# Patient Record
Sex: FEMALE | Race: WHITE | NOT HISPANIC OR LATINO | Employment: FULL TIME | URBAN - METROPOLITAN AREA
[De-identification: names, ages, dates, MRNs, and addresses within clinical notes are randomized per-mention and may not be internally consistent; named-entity substitution may affect disease eponyms.]

---

## 2017-12-07 ENCOUNTER — APPOINTMENT (OUTPATIENT)
Dept: WOMENS IMAGING | Facility: HOSPITAL | Age: 47
End: 2017-12-07

## 2017-12-07 PROCEDURE — 77063 BREAST TOMOSYNTHESIS BI: CPT | Performed by: RADIOLOGY

## 2017-12-07 PROCEDURE — 77067 SCR MAMMO BI INCL CAD: CPT | Performed by: RADIOLOGY

## 2018-04-09 ENCOUNTER — OFFICE VISIT (OUTPATIENT)
Dept: GASTROENTEROLOGY | Facility: CLINIC | Age: 48
End: 2018-04-09

## 2018-04-09 VITALS
DIASTOLIC BLOOD PRESSURE: 72 MMHG | WEIGHT: 212.4 LBS | BODY MASS INDEX: 33.34 KG/M2 | HEIGHT: 67 IN | TEMPERATURE: 98.5 F | SYSTOLIC BLOOD PRESSURE: 136 MMHG

## 2018-04-09 DIAGNOSIS — K62.89 ANAL OR RECTAL PAIN: Primary | ICD-10-CM

## 2018-04-09 PROCEDURE — 99204 OFFICE O/P NEW MOD 45 MIN: CPT | Performed by: INTERNAL MEDICINE

## 2018-04-09 RX ORDER — ALBUTEROL SULFATE 90 UG/1
2 AEROSOL, METERED RESPIRATORY (INHALATION) EVERY 4 HOURS PRN
COMMUNITY
Start: 2017-02-23

## 2018-04-09 RX ORDER — HYDROCORTISONE ACETATE 25 MG/1
25 SUPPOSITORY RECTAL DAILY
Qty: 10 SUPPOSITORY | Refills: 5 | Status: SHIPPED | OUTPATIENT
Start: 2018-04-09 | End: 2021-04-13

## 2018-04-09 RX ORDER — FLUTICASONE PROPIONATE 110 UG/1
2 AEROSOL, METERED RESPIRATORY (INHALATION)
COMMUNITY
Start: 2017-02-23 | End: 2021-04-13

## 2018-04-09 NOTE — PROGRESS NOTES
Chief Complaint   Patient presents with   • Rectal Pain        Sonia Henning is a  47 y.o. female here for an initial visit for Rectal pain    HPI This 47-year-old -American female patient of Dr. Africa Lea presents with occasional discomfort at the rectum associated with pressure or frequent bowel movements.  She had undergone colonoscopic examination in 2010 with evidence of internal and external hemorrhoids.  She denies any rectal bleeding.  She has had issues with her hemorrhoids since the birth of her child.  We had treated her for internal hemorrhoidal problems in the past with suppositories with good effect.  She admits to intermittent episodes of multiple bowel movements as well as change in stool caliber.  She would be due for screening evaluation of her colon in 2020.  I advised trial of suppositories to treat her hemorrhoids.  If her symptoms persist I would consider more formal GI assessment.    Past Medical History:   Diagnosis Date   • Asthma    • Sleep apnea        Current Outpatient Prescriptions   Medication Sig Dispense Refill   • albuterol (PROAIR HFA) 108 (90 Base) MCG/ACT inhaler Inhale 2 puffs.     • fluticasone (FLOVENT HFA) 110 MCG/ACT inhaler Inhale 2 puffs.       No current facility-administered medications for this visit.        PRN Meds:.    Allergies   Allergen Reactions   • Tetracyclines & Related Hives       Social History     Social History   • Marital status: Unknown     Spouse name: N/A   • Number of children: N/A   • Years of education: N/A     Occupational History   • Not on file.     Social History Main Topics   • Smoking status: Never Smoker   • Smokeless tobacco: Never Used   • Alcohol use Yes      Comment: socially    • Drug use: No   • Sexual activity: Not on file     Other Topics Concern   • Not on file     Social History Narrative   • No narrative on file       History reviewed. No pertinent family history.    Review of Systems   Constitutional: Negative for activity  change, appetite change, fatigue and unexpected weight change.   HENT: Negative for congestion, facial swelling, sore throat, trouble swallowing and voice change.    Eyes: Negative for photophobia and visual disturbance.   Respiratory: Negative for cough and choking.    Cardiovascular: Negative for chest pain.   Gastrointestinal: Positive for rectal pain. Negative for abdominal distention, abdominal pain, anal bleeding, blood in stool, constipation, diarrhea, nausea and vomiting.   Endocrine: Negative for polyphagia.   Musculoskeletal: Negative for arthralgias, gait problem and joint swelling.   Skin: Negative for color change, pallor and rash.   Allergic/Immunologic: Negative for food allergies.   Neurological: Negative for speech difficulty and headaches.   Hematological: Does not bruise/bleed easily.   Psychiatric/Behavioral: Negative for agitation, confusion and sleep disturbance.       Vitals:    04/09/18 1326   BP: 136/72   Temp: 98.5 °F (36.9 °C)       Physical Exam   Constitutional: She is oriented to person, place, and time. She appears well-developed and well-nourished.   HENT:   Head: Normocephalic.   Mouth/Throat: Oropharynx is clear and moist.   Eyes: Conjunctivae and EOM are normal.   Neck: Normal range of motion.   Cardiovascular: Normal rate and regular rhythm.    Pulmonary/Chest: Breath sounds normal.   Abdominal: Soft. Bowel sounds are normal.   Musculoskeletal: Normal range of motion.   Neurological: She is alert and oriented to person, place, and time.   Skin: Skin is warm and dry.   Psychiatric: She has a normal mood and affect. Her behavior is normal.       ASSESSMENT   #1 rectal pain: Consistent with hemorrhoidal issues        PLAN  Trial of Anusol with hydrocortisone suppositories to be taken at bedtime  Patient to call with a progress report, if no improvement we'll consider more formal GI assessment      ICD-10-CM ICD-9-CM   1. Anal or rectal pain K62.89 569.42

## 2018-04-19 ENCOUNTER — TRANSCRIBE ORDERS (OUTPATIENT)
Dept: ADMINISTRATIVE | Facility: HOSPITAL | Age: 48
End: 2018-04-19

## 2018-04-19 ENCOUNTER — HOSPITAL ENCOUNTER (OUTPATIENT)
Dept: CARDIOLOGY | Facility: HOSPITAL | Age: 48
Discharge: HOME | End: 2018-04-19
Attending: SPECIALIST | Admitting: SPECIALIST

## 2018-04-19 ENCOUNTER — HOSPITAL ENCOUNTER (OUTPATIENT)
Dept: GENERAL RADIOLOGY | Facility: HOSPITAL | Age: 48
Discharge: HOME | End: 2018-04-19
Attending: SPECIALIST

## 2018-04-19 DIAGNOSIS — I15.9 SECONDARY HYPERTENSION: ICD-10-CM

## 2018-04-19 DIAGNOSIS — I10 HYPERTENSION, UNSPECIFIED TYPE: ICD-10-CM

## 2018-04-19 DIAGNOSIS — I15.9 SECONDARY HYPERTENSION: Primary | ICD-10-CM

## 2018-04-19 PROCEDURE — 71046 X-RAY EXAM CHEST 2 VIEWS: CPT

## 2018-04-19 PROCEDURE — 93005 ELECTROCARDIOGRAM TRACING: CPT | Performed by: SPECIALIST

## 2018-04-19 PROCEDURE — 93010 ELECTROCARDIOGRAM REPORT: CPT | Performed by: INTERNAL MEDICINE

## 2018-10-09 ENCOUNTER — TRANSCRIBE ORDERS (OUTPATIENT)
Dept: ADMINISTRATIVE | Facility: HOSPITAL | Age: 48
End: 2018-10-09

## 2018-10-09 DIAGNOSIS — E04.9 NON-TOXIC GOITER: Primary | ICD-10-CM

## 2018-10-13 ENCOUNTER — HOSPITAL ENCOUNTER (OUTPATIENT)
Dept: ULTRASOUND IMAGING | Facility: HOSPITAL | Age: 48
Discharge: HOME | End: 2018-10-13
Attending: SPECIALIST | Admitting: SPECIALIST

## 2018-10-13 DIAGNOSIS — E04.9 NON-TOXIC GOITER: ICD-10-CM

## 2018-10-13 PROCEDURE — 76536 US EXAM OF HEAD AND NECK: CPT

## 2019-03-07 ENCOUNTER — APPOINTMENT (OUTPATIENT)
Dept: WOMENS IMAGING | Facility: HOSPITAL | Age: 49
End: 2019-03-07

## 2019-03-07 PROCEDURE — 77063 BREAST TOMOSYNTHESIS BI: CPT | Performed by: RADIOLOGY

## 2019-03-07 PROCEDURE — 77067 SCR MAMMO BI INCL CAD: CPT | Performed by: RADIOLOGY

## 2019-07-10 ENCOUNTER — OFFICE VISIT (OUTPATIENT)
Dept: SURGERY | Facility: CLINIC | Age: 49
End: 2019-07-10

## 2019-07-10 VITALS
OXYGEN SATURATION: 97 % | BODY MASS INDEX: 31.39 KG/M2 | HEART RATE: 63 BPM | HEIGHT: 67 IN | WEIGHT: 200 LBS | SYSTOLIC BLOOD PRESSURE: 159 MMHG | DIASTOLIC BLOOD PRESSURE: 89 MMHG

## 2019-07-10 DIAGNOSIS — R22.2 MASS OF CHEST WALL, RIGHT: Primary | ICD-10-CM

## 2019-07-10 PROCEDURE — 99243 OFF/OP CNSLTJ NEW/EST LOW 30: CPT | Performed by: THORACIC SURGERY (CARDIOTHORACIC VASCULAR SURGERY)

## 2019-07-10 NOTE — PROGRESS NOTES
Subjective   Patient ID: Sonia Henning is a 49 y.o. female is here today at the request of Dr Rollins.    History of Present Illness  Dear Colleague,  Sonia Henning was seen in our office today for evaluation and treatment of the right sternoclavicular joint.  Patient is a 49-year-old -American female.  She was working out lifting some weights when she felt a pop in the right sternoclavicular joint area.  This was sore for several days but has not caused her any problems since.  There has been very little swelling.  There has been no redness.  There is been no pain.  Patient has resumed all of her normal activities without any restrictions.  She mentioned this to her primary care physician and that she is here now for further evaluation.    The following portions of the patient's history were reviewed and updated as appropriate: allergies, current medications, past family history, past medical history, past social history, past surgical history and problem list.  Review of Systems   Constitution: Negative.   HENT: Negative.    Eyes: Negative.    Cardiovascular: Negative.    Respiratory: Negative.    Endocrine: Negative.    Hematologic/Lymphatic: Negative.    Skin: Negative.    Musculoskeletal: Negative.    Gastrointestinal: Negative.    Genitourinary: Negative.    Neurological: Negative.    Psychiatric/Behavioral: Negative.    Allergic/Immunologic: Negative.      Patient Active Problem List   Diagnosis   • Mass of chest wall, right     Past Medical History:   Diagnosis Date   • Asthma    • Sleep apnea      Past Surgical History:   Procedure Laterality Date   • COLONOSCOPY  12/21/2010    [facility]    • COLONOSCOPY  03/2000   • CYST REMOVAL     • SINUS SURGERY       History reviewed. No pertinent family history.  Social History     Socioeconomic History   • Marital status: Unknown     Spouse name: Not on file   • Number of children: Not on file   • Years of education: Not on file   • Highest education level: Not  on file   Tobacco Use   • Smoking status: Never Smoker   • Smokeless tobacco: Never Used   Substance and Sexual Activity   • Alcohol use: Yes     Comment: socially    • Drug use: No       Current Outpatient Medications:   •  albuterol (PROAIR HFA) 108 (90 Base) MCG/ACT inhaler, Inhale 2 puffs., Disp: , Rfl:   •  fluticasone (FLOVENT HFA) 110 MCG/ACT inhaler, Inhale 2 puffs., Disp: , Rfl:   •  hydrocortisone (ANUSOL-HC) 25 MG suppository, Insert 1 suppository into the rectum Daily. Administer suppository at bedtime, Disp: 10 suppository, Rfl: 5  Allergies   Allergen Reactions   • Tetracyclines & Related Hives        Objective   Vitals:    07/10/19 1030   BP: 159/89   Pulse: 63   SpO2: 97%     Physical Exam   Constitutional: She is oriented to person, place, and time. She appears well-developed and well-nourished.   HENT:   Head: Normocephalic.   Eyes: Conjunctivae, EOM and lids are normal. Pupils are equal, round, and reactive to light.   Neck: Trachea normal and normal range of motion. Neck supple. No hepatojugular reflux and no JVD present. Carotid bruit is not present. No thyroid mass and no thyromegaly present.   Cardiovascular: Normal rate, regular rhythm, S1 normal, S2 normal, normal heart sounds and normal pulses.  No extrasystoles are present. PMI is not displaced.   Pulmonary/Chest: Effort normal and breath sounds normal.       Abdominal: Soft. Normal appearance and bowel sounds are normal. She exhibits no mass. There is no hepatosplenomegaly. There is no tenderness. No hernia.   Musculoskeletal: Normal range of motion.   Neurological: She is alert and oriented to person, place, and time. She has normal strength and normal reflexes. No cranial nerve deficit or sensory deficit. She displays a negative Romberg sign.   Skin: Skin is warm, dry and intact.   Psychiatric: She has a normal mood and affect. Her speech is normal and behavior is normal. Judgment and thought content normal. Cognition and memory are  normal.     Independent Review of Radiographic Studies:    Chest x-ray performed April 19, 2018 was independently reviewed.  This is the most recent x-ray available.  No significant abnormalities were identified.  No acute disease process.    Assessment/Plan   Assessment: I believe that this is just some swelling in the sternoclavicular joint area.  In a lady this age this could certainly represent some arthritic changes in this joint.  Certainly see nothing that would be concerning for a malignancy.  I think further evaluation is warranted and have recommended a CT of the chest.  I have explained all this to the patient in detail.  I have explained the need for the CT scan.  I have answered all of her questions.      Plan: Patient will undergo CT scan of the chest.  I will review the scan and then call her with the results.  I will keep you informed of her progress.    Diagnoses and all orders for this visit:    Mass of chest wall, right  -     CT Chest Without Contrast; Future

## 2019-10-30 ENCOUNTER — HOSPITAL ENCOUNTER (OUTPATIENT)
Dept: CT IMAGING | Facility: HOSPITAL | Age: 49
Discharge: HOME | End: 2019-10-30
Admitting: THORACIC SURGERY (CARDIOTHORACIC VASCULAR SURGERY)

## 2019-10-30 DIAGNOSIS — R22.2 MASS OF CHEST WALL, RIGHT: ICD-10-CM

## 2019-10-30 PROCEDURE — 71250 CT THORAX DX C-: CPT

## 2019-11-06 ENCOUNTER — HOSPITAL ENCOUNTER (OUTPATIENT)
Dept: GENERAL RADIOLOGY | Facility: HOSPITAL | Age: 49
Discharge: HOME | End: 2019-11-06
Admitting: SPECIALIST

## 2019-11-06 DIAGNOSIS — R52 PAIN: ICD-10-CM

## 2019-11-06 PROCEDURE — 72110 X-RAY EXAM L-2 SPINE 4/>VWS: CPT

## 2020-08-27 ENCOUNTER — APPOINTMENT (OUTPATIENT)
Dept: WOMENS IMAGING | Facility: HOSPITAL | Age: 50
End: 2020-08-27

## 2020-08-27 PROCEDURE — 77067 SCR MAMMO BI INCL CAD: CPT | Performed by: RADIOLOGY

## 2020-08-27 PROCEDURE — 77063 BREAST TOMOSYNTHESIS BI: CPT | Performed by: RADIOLOGY

## 2021-03-29 ENCOUNTER — OFFICE VISIT (OUTPATIENT)
Dept: GASTROENTEROLOGY | Facility: CLINIC | Age: 51
End: 2021-03-29

## 2021-03-29 VITALS — BODY MASS INDEX: 31.55 KG/M2 | HEIGHT: 67 IN | TEMPERATURE: 98 F | WEIGHT: 201 LBS

## 2021-03-29 DIAGNOSIS — Z12.12 SCREENING FOR COLORECTAL CANCER: ICD-10-CM

## 2021-03-29 DIAGNOSIS — K64.9 HEMORRHOIDS, UNSPECIFIED HEMORRHOID TYPE: Primary | ICD-10-CM

## 2021-03-29 DIAGNOSIS — Z12.11 SCREENING FOR COLORECTAL CANCER: ICD-10-CM

## 2021-03-29 PROCEDURE — 99213 OFFICE O/P EST LOW 20 MIN: CPT | Performed by: INTERNAL MEDICINE

## 2021-03-29 NOTE — PROGRESS NOTES
"Chief Complaint   Patient presents with   • Hemorrhoids        Sonia Henning is a  50 y.o. female here for a follow up visit for hemorrhoids    HPI this 50-year-old -American female patient of Dr. Africa Lea presents with \"hemorrhoids\".  She had been seen through this office in April 2018 for rectal pain and a known history of both internal and external hemorrhoids by previous colonoscopic evaluation from 2010.  She was treated with symptomatic medications and offered more formal GI assessment but did not return for follow-up.  She describes a sensation of \"blockage\" although she is having daily bowel movements she requires straining to pass stools.  She denies rectal pain or bleeding.  The caliber of her stools is smaller than normal but not pencil thin.  Given the fact she is due for screening evaluation of her colon and this change in bowel caliber I encouraged her to undergo colonoscopic evaluation.  She is amenable to this.    Past Medical History:   Diagnosis Date   • Asthma    • Sleep apnea        Current Outpatient Medications   Medication Sig Dispense Refill   • albuterol (PROAIR HFA) 108 (90 Base) MCG/ACT inhaler Inhale 2 puffs.     • fluticasone (FLOVENT HFA) 110 MCG/ACT inhaler Inhale 2 puffs.     • hydrocortisone (ANUSOL-HC) 25 MG suppository Insert 1 suppository into the rectum Daily. Administer suppository at bedtime 10 suppository 5     No current facility-administered medications for this visit.       PRN Meds:.    Allergies   Allergen Reactions   • Tetracyclines & Related Hives       Social History     Socioeconomic History   • Marital status: Single     Spouse name: Not on file   • Number of children: Not on file   • Years of education: Not on file   • Highest education level: Not on file   Tobacco Use   • Smoking status: Never Smoker   • Smokeless tobacco: Never Used   Substance and Sexual Activity   • Alcohol use: Yes     Comment: socially    • Drug use: No       History reviewed. No " pertinent family history.    Review of Systems   Constitutional: Negative for activity change, appetite change, fatigue and unexpected weight change.   HENT: Negative for congestion, facial swelling, sore throat, trouble swallowing and voice change.    Eyes: Negative for photophobia and visual disturbance.   Respiratory: Negative for cough and choking.    Cardiovascular: Negative for chest pain.   Gastrointestinal: Negative for abdominal distention, abdominal pain, anal bleeding, blood in stool, constipation, diarrhea, nausea, rectal pain and vomiting.   Endocrine: Negative for polyphagia.   Musculoskeletal: Negative for arthralgias, gait problem and joint swelling.   Skin: Negative for color change, pallor and rash.   Allergic/Immunologic: Negative for food allergies.   Neurological: Negative for speech difficulty and headaches.   Hematological: Does not bruise/bleed easily.   Psychiatric/Behavioral: Negative for agitation, confusion and sleep disturbance.       Vitals:    03/29/21 1445   Temp: 98 °F (36.7 °C)       Physical Exam  Constitutional:       Appearance: She is well-developed.   HENT:      Head: Normocephalic.   Eyes:      Conjunctiva/sclera: Conjunctivae normal.   Cardiovascular:      Rate and Rhythm: Normal rate and regular rhythm.   Pulmonary:      Breath sounds: Normal breath sounds.   Abdominal:      General: Bowel sounds are normal.      Palpations: Abdomen is soft.   Musculoskeletal:         General: Normal range of motion.      Cervical back: Normal range of motion.   Skin:     General: Skin is warm and dry.   Neurological:      Mental Status: She is alert and oriented to person, place, and time.   Psychiatric:         Behavior: Behavior normal.         ASSESSMENT   #1 incomplete evacuation  #2 history of hemorrhoids  #3 screening for colorectal cancer      PLAN  Schedule colonoscopy  Pending results may consider colorectal evaluation / treatment      ICD-10-CM ICD-9-CM   1. Hemorrhoids,  unspecified hemorrhoid type  K64.9 455.6

## 2021-04-13 ENCOUNTER — APPOINTMENT (OUTPATIENT)
Dept: CT IMAGING | Facility: HOSPITAL | Age: 51
End: 2021-04-13

## 2021-04-13 ENCOUNTER — HOSPITAL ENCOUNTER (INPATIENT)
Facility: HOSPITAL | Age: 51
LOS: 3 days | Discharge: HOME | End: 2021-04-16
Attending: EMERGENCY MEDICINE | Admitting: INTERNAL MEDICINE

## 2021-04-13 DIAGNOSIS — I61.9 INTRAPARENCHYMAL HEMORRHAGE OF BRAIN (HCC): Primary | ICD-10-CM

## 2021-04-13 DIAGNOSIS — I10 HYPERTENSION, UNSPECIFIED TYPE: ICD-10-CM

## 2021-04-13 DIAGNOSIS — R42 DIZZINESS: ICD-10-CM

## 2021-04-13 LAB
ALBUMIN SERPL-MCNC: 4.7 G/DL (ref 3.5–5.2)
ALBUMIN/GLOB SERPL: 1.2 G/DL
ALP SERPL-CCNC: 133 U/L (ref 39–117)
ALT SERPL W P-5'-P-CCNC: 17 U/L (ref 1–33)
ANION GAP SERPL CALCULATED.3IONS-SCNC: 12 MMOL/L (ref 5–15)
AST SERPL-CCNC: 26 U/L (ref 1–32)
BACTERIA UR QL AUTO: ABNORMAL /HPF
BASOPHILS # BLD AUTO: 0.05 10*3/MM3 (ref 0–0.2)
BASOPHILS NFR BLD AUTO: 0.4 % (ref 0–1.5)
BILIRUB SERPL-MCNC: 0.5 MG/DL (ref 0–1.2)
BILIRUB UR QL STRIP: NEGATIVE
BUN SERPL-MCNC: 12 MG/DL (ref 6–20)
BUN/CREAT SERPL: 9 (ref 7–25)
CALCIUM SPEC-SCNC: 9.4 MG/DL (ref 8.6–10.5)
CHLORIDE SERPL-SCNC: 99 MMOL/L (ref 98–107)
CLARITY UR: CLEAR
CO2 SERPL-SCNC: 28 MMOL/L (ref 22–29)
COLOR UR: YELLOW
CREAT SERPL-MCNC: 1.33 MG/DL (ref 0.57–1)
DEPRECATED RDW RBC AUTO: 36.7 FL (ref 37–54)
EOSINOPHIL # BLD AUTO: 0.03 10*3/MM3 (ref 0–0.4)
EOSINOPHIL NFR BLD AUTO: 0.3 % (ref 0.3–6.2)
ERYTHROCYTE [DISTWIDTH] IN BLOOD BY AUTOMATED COUNT: 12.1 % (ref 12.3–15.4)
GFR SERPL CREATININE-BSD FRML MDRD: 42 ML/MIN/1.73
GLOBULIN UR ELPH-MCNC: 4 GM/DL
GLUCOSE BLDC GLUCOMTR-MCNC: 116 MG/DL (ref 70–130)
GLUCOSE BLDC GLUCOMTR-MCNC: 94 MG/DL (ref 70–130)
GLUCOSE SERPL-MCNC: 90 MG/DL (ref 65–99)
GLUCOSE UR STRIP-MCNC: NEGATIVE MG/DL
HCT VFR BLD AUTO: 44.5 % (ref 34–46.6)
HGB BLD-MCNC: 14.3 G/DL (ref 12–15.9)
HGB UR QL STRIP.AUTO: ABNORMAL
HYALINE CASTS UR QL AUTO: ABNORMAL /LPF
IMM GRANULOCYTES # BLD AUTO: 0.06 10*3/MM3 (ref 0–0.05)
IMM GRANULOCYTES NFR BLD AUTO: 0.5 % (ref 0–0.5)
KETONES UR QL STRIP: NEGATIVE
LEUKOCYTE ESTERASE UR QL STRIP.AUTO: ABNORMAL
LYMPHOCYTES # BLD AUTO: 0.82 10*3/MM3 (ref 0.7–3.1)
LYMPHOCYTES NFR BLD AUTO: 7.1 % (ref 19.6–45.3)
MAGNESIUM SERPL-MCNC: 2.5 MG/DL (ref 1.6–2.6)
MCH RBC QN AUTO: 26.9 PG (ref 26.6–33)
MCHC RBC AUTO-ENTMCNC: 32.1 G/DL (ref 31.5–35.7)
MCV RBC AUTO: 83.6 FL (ref 79–97)
MONOCYTES # BLD AUTO: 0.67 10*3/MM3 (ref 0.1–0.9)
MONOCYTES NFR BLD AUTO: 5.8 % (ref 5–12)
NEUTROPHILS NFR BLD AUTO: 85.9 % (ref 42.7–76)
NEUTROPHILS NFR BLD AUTO: 9.88 10*3/MM3 (ref 1.7–7)
NITRITE UR QL STRIP: NEGATIVE
NRBC BLD AUTO-RTO: 0 /100 WBC (ref 0–0.2)
PH UR STRIP.AUTO: 6.5 [PH] (ref 5–8)
PLATELET # BLD AUTO: 266 10*3/MM3 (ref 140–450)
PMV BLD AUTO: 8.8 FL (ref 6–12)
POTASSIUM SERPL-SCNC: 3.3 MMOL/L (ref 3.5–5.2)
PROT SERPL-MCNC: 8.7 G/DL (ref 6–8.5)
PROT UR QL STRIP: ABNORMAL
QT INTERVAL: 330 MS
RBC # BLD AUTO: 5.32 10*6/MM3 (ref 3.77–5.28)
RBC # UR: ABNORMAL /HPF
REF LAB TEST METHOD: ABNORMAL
SARS-COV-2 RNA RESP QL NAA+PROBE: NOT DETECTED
SODIUM SERPL-SCNC: 139 MMOL/L (ref 136–145)
SP GR UR STRIP: 1.01 (ref 1–1.03)
SQUAMOUS #/AREA URNS HPF: ABNORMAL /HPF
TROPONIN T SERPL-MCNC: <0.01 NG/ML (ref 0–0.03)
UROBILINOGEN UR QL STRIP: ABNORMAL
WBC # BLD AUTO: 11.51 10*3/MM3 (ref 3.4–10.8)
WBC UR QL AUTO: ABNORMAL /HPF

## 2021-04-13 PROCEDURE — 83735 ASSAY OF MAGNESIUM: CPT | Performed by: EMERGENCY MEDICINE

## 2021-04-13 PROCEDURE — 70496 CT ANGIOGRAPHY HEAD: CPT

## 2021-04-13 PROCEDURE — 82962 GLUCOSE BLOOD TEST: CPT

## 2021-04-13 PROCEDURE — 70498 CT ANGIOGRAPHY NECK: CPT

## 2021-04-13 PROCEDURE — 93005 ELECTROCARDIOGRAM TRACING: CPT

## 2021-04-13 PROCEDURE — 81001 URINALYSIS AUTO W/SCOPE: CPT | Performed by: EMERGENCY MEDICINE

## 2021-04-13 PROCEDURE — U0003 INFECTIOUS AGENT DETECTION BY NUCLEIC ACID (DNA OR RNA); SEVERE ACUTE RESPIRATORY SYNDROME CORONAVIRUS 2 (SARS-COV-2) (CORONAVIRUS DISEASE [COVID-19]), AMPLIFIED PROBE TECHNIQUE, MAKING USE OF HIGH THROUGHPUT TECHNOLOGIES AS DESCRIBED BY CMS-2020-01-R: HCPCS | Performed by: EMERGENCY MEDICINE

## 2021-04-13 PROCEDURE — 25010000002 LEVETIRACETAM IN NACL 0.82% 500 MG/100ML SOLUTION: Performed by: NURSE PRACTITIONER

## 2021-04-13 PROCEDURE — 0 IOPAMIDOL PER 1 ML: Performed by: INTERNAL MEDICINE

## 2021-04-13 PROCEDURE — 80053 COMPREHEN METABOLIC PANEL: CPT | Performed by: EMERGENCY MEDICINE

## 2021-04-13 PROCEDURE — 85025 COMPLETE CBC W/AUTO DIFF WBC: CPT | Performed by: EMERGENCY MEDICINE

## 2021-04-13 PROCEDURE — 70450 CT HEAD/BRAIN W/O DYE: CPT

## 2021-04-13 PROCEDURE — 84484 ASSAY OF TROPONIN QUANT: CPT | Performed by: EMERGENCY MEDICINE

## 2021-04-13 PROCEDURE — 99254 IP/OBS CNSLTJ NEW/EST MOD 60: CPT | Performed by: NURSE PRACTITIONER

## 2021-04-13 PROCEDURE — 99285 EMERGENCY DEPT VISIT HI MDM: CPT

## 2021-04-13 PROCEDURE — 93010 ELECTROCARDIOGRAM REPORT: CPT | Performed by: INTERNAL MEDICINE

## 2021-04-13 RX ORDER — SODIUM CHLORIDE 0.9 % (FLUSH) 0.9 %
10 SYRINGE (ML) INJECTION EVERY 12 HOURS SCHEDULED
Status: DISCONTINUED | OUTPATIENT
Start: 2021-04-13 | End: 2021-04-16 | Stop reason: HOSPADM

## 2021-04-13 RX ORDER — LABETALOL HYDROCHLORIDE 5 MG/ML
20 INJECTION, SOLUTION INTRAVENOUS ONCE
Status: COMPLETED | OUTPATIENT
Start: 2021-04-13 | End: 2021-04-13

## 2021-04-13 RX ORDER — SODIUM CHLORIDE 0.9 % (FLUSH) 0.9 %
10 SYRINGE (ML) INJECTION AS NEEDED
Status: DISCONTINUED | OUTPATIENT
Start: 2021-04-13 | End: 2021-04-16 | Stop reason: HOSPADM

## 2021-04-13 RX ORDER — POTASSIUM CHLORIDE 7.45 MG/ML
10 INJECTION INTRAVENOUS
Status: DISCONTINUED | OUTPATIENT
Start: 2021-04-13 | End: 2021-04-16 | Stop reason: HOSPADM

## 2021-04-13 RX ORDER — LEVETIRACETAM 5 MG/ML
500 INJECTION INTRAVASCULAR EVERY 12 HOURS SCHEDULED
Status: DISCONTINUED | OUTPATIENT
Start: 2021-04-13 | End: 2021-04-14

## 2021-04-13 RX ADMIN — SODIUM CHLORIDE, PRESERVATIVE FREE 10 ML: 5 INJECTION INTRAVENOUS at 20:20

## 2021-04-13 RX ADMIN — LEVETIRACETAM 500 MG: 5 INJECTION INTRAVASCULAR at 20:20

## 2021-04-13 RX ADMIN — SODIUM CHLORIDE 5 MG/HR: 9 INJECTION, SOLUTION INTRAVENOUS at 12:54

## 2021-04-13 RX ADMIN — SODIUM CHLORIDE 7.5 MG/HR: 9 INJECTION, SOLUTION INTRAVENOUS at 17:46

## 2021-04-13 RX ADMIN — LEVETIRACETAM 500 MG: 5 INJECTION INTRAVASCULAR at 14:50

## 2021-04-13 RX ADMIN — SODIUM CHLORIDE 12.5 MG/HR: 9 INJECTION, SOLUTION INTRAVENOUS at 15:20

## 2021-04-13 RX ADMIN — IOPAMIDOL 95 ML: 755 INJECTION, SOLUTION INTRAVENOUS at 20:59

## 2021-04-13 RX ADMIN — SODIUM CHLORIDE 5 MG/HR: 9 INJECTION, SOLUTION INTRAVENOUS at 19:27

## 2021-04-13 RX ADMIN — LABETALOL HYDROCHLORIDE 20 MG: 5 INJECTION, SOLUTION INTRAVENOUS at 14:09

## 2021-04-14 ENCOUNTER — APPOINTMENT (OUTPATIENT)
Dept: MRI IMAGING | Facility: HOSPITAL | Age: 51
End: 2021-04-14

## 2021-04-14 ENCOUNTER — APPOINTMENT (OUTPATIENT)
Dept: CT IMAGING | Facility: HOSPITAL | Age: 51
End: 2021-04-14

## 2021-04-14 ENCOUNTER — RESEARCH ENCOUNTER (OUTPATIENT)
Dept: CARDIOLOGY | Facility: CLINIC | Age: 51
End: 2021-04-14

## 2021-04-14 LAB
ANION GAP SERPL CALCULATED.3IONS-SCNC: 9.9 MMOL/L (ref 5–15)
BUN SERPL-MCNC: 14 MG/DL (ref 6–20)
BUN/CREAT SERPL: 12.3 (ref 7–25)
CALCIUM SPEC-SCNC: 9.2 MG/DL (ref 8.6–10.5)
CHLORIDE SERPL-SCNC: 107 MMOL/L (ref 98–107)
CHOLEST SERPL-MCNC: 173 MG/DL (ref 0–200)
CO2 SERPL-SCNC: 26.1 MMOL/L (ref 22–29)
CREAT SERPL-MCNC: 1.14 MG/DL (ref 0.57–1)
GFR SERPL CREATININE-BSD FRML MDRD: 50 ML/MIN/1.73
GLUCOSE BLDC GLUCOMTR-MCNC: 107 MG/DL (ref 70–130)
GLUCOSE BLDC GLUCOMTR-MCNC: 84 MG/DL (ref 70–130)
GLUCOSE SERPL-MCNC: 92 MG/DL (ref 65–99)
HBA1C MFR BLD: 4.7 % (ref 4.8–5.6)
HDLC SERPL-MCNC: 44 MG/DL (ref 40–60)
LDLC SERPL CALC-MCNC: 112 MG/DL (ref 0–100)
LDLC/HDLC SERPL: 2.52 {RATIO}
POTASSIUM SERPL-SCNC: 3.5 MMOL/L (ref 3.5–5.2)
SODIUM SERPL-SCNC: 143 MMOL/L (ref 136–145)
TRIGL SERPL-MCNC: 90 MG/DL (ref 0–150)
VLDLC SERPL-MCNC: 17 MG/DL (ref 5–40)

## 2021-04-14 PROCEDURE — 99223 1ST HOSP IP/OBS HIGH 75: CPT | Performed by: PSYCHIATRY & NEUROLOGY

## 2021-04-14 PROCEDURE — 83036 HEMOGLOBIN GLYCOSYLATED A1C: CPT | Performed by: INTERNAL MEDICINE

## 2021-04-14 PROCEDURE — 80061 LIPID PANEL: CPT | Performed by: INTERNAL MEDICINE

## 2021-04-14 PROCEDURE — 0 GADOBENATE DIMEGLUMINE 529 MG/ML SOLUTION: Performed by: INTERNAL MEDICINE

## 2021-04-14 PROCEDURE — 70450 CT HEAD/BRAIN W/O DYE: CPT

## 2021-04-14 PROCEDURE — 82962 GLUCOSE BLOOD TEST: CPT

## 2021-04-14 PROCEDURE — 80048 BASIC METABOLIC PNL TOTAL CA: CPT | Performed by: INTERNAL MEDICINE

## 2021-04-14 PROCEDURE — 99232 SBSQ HOSP IP/OBS MODERATE 35: CPT | Performed by: NURSE PRACTITIONER

## 2021-04-14 PROCEDURE — 70553 MRI BRAIN STEM W/O & W/DYE: CPT

## 2021-04-14 PROCEDURE — A9577 INJ MULTIHANCE: HCPCS | Performed by: INTERNAL MEDICINE

## 2021-04-14 PROCEDURE — 25010000002 HYDRALAZINE PER 20 MG: Performed by: INTERNAL MEDICINE

## 2021-04-14 PROCEDURE — 25010000002 LEVETIRACETAM IN NACL 0.82% 500 MG/100ML SOLUTION: Performed by: NURSE PRACTITIONER

## 2021-04-14 RX ORDER — HYDRALAZINE HYDROCHLORIDE 20 MG/ML
10 INJECTION INTRAMUSCULAR; INTRAVENOUS EVERY 6 HOURS PRN
Status: DISCONTINUED | OUTPATIENT
Start: 2021-04-14 | End: 2021-04-16 | Stop reason: HOSPADM

## 2021-04-14 RX ORDER — LISINOPRIL 10 MG/1
10 TABLET ORAL
Status: DISCONTINUED | OUTPATIENT
Start: 2021-04-14 | End: 2021-04-16 | Stop reason: HOSPADM

## 2021-04-14 RX ORDER — ENALAPRILAT 2.5 MG/2ML
1.25 INJECTION INTRAVENOUS ONCE
Status: DISCONTINUED | OUTPATIENT
Start: 2021-04-14 | End: 2021-04-14

## 2021-04-14 RX ORDER — LEVETIRACETAM 500 MG/1
500 TABLET ORAL 2 TIMES DAILY
Status: DISCONTINUED | OUTPATIENT
Start: 2021-04-14 | End: 2021-04-16

## 2021-04-14 RX ORDER — ENALAPRILAT 2.5 MG/2ML
1.25 INJECTION INTRAVENOUS ONCE
Status: COMPLETED | OUTPATIENT
Start: 2021-04-14 | End: 2021-04-14

## 2021-04-14 RX ADMIN — LISINOPRIL 10 MG: 10 TABLET ORAL at 11:36

## 2021-04-14 RX ADMIN — SODIUM CHLORIDE 5 MG/HR: 9 INJECTION, SOLUTION INTRAVENOUS at 14:21

## 2021-04-14 RX ADMIN — LEVETIRACETAM 500 MG: 500 TABLET, FILM COATED ORAL at 20:04

## 2021-04-14 RX ADMIN — SODIUM CHLORIDE 2.5 MG/HR: 9 INJECTION, SOLUTION INTRAVENOUS at 20:10

## 2021-04-14 RX ADMIN — ENALAPRILAT 1.25 MG: 1.25 INJECTION INTRAVENOUS at 12:15

## 2021-04-14 RX ADMIN — SODIUM CHLORIDE, PRESERVATIVE FREE 10 ML: 5 INJECTION INTRAVENOUS at 08:19

## 2021-04-14 RX ADMIN — GADOBENATE DIMEGLUMINE 18 ML: 529 INJECTION, SOLUTION INTRAVENOUS at 22:23

## 2021-04-14 RX ADMIN — HYDRALAZINE HYDROCHLORIDE 10 MG: 20 INJECTION, SOLUTION INTRAMUSCULAR; INTRAVENOUS at 10:10

## 2021-04-14 RX ADMIN — SODIUM CHLORIDE, PRESERVATIVE FREE 10 ML: 5 INJECTION INTRAVENOUS at 20:05

## 2021-04-14 RX ADMIN — LEVETIRACETAM 500 MG: 5 INJECTION INTRAVASCULAR at 08:18

## 2021-04-14 NOTE — RESEARCH
Research Study: [facility]  Subject Study ID#: [id number]  Subject Initials: FLACO SAM presented to [facility] on April 13, 2021 and was diagnosed with ICH. I identified FLACO as a potential candidate for the [facility] research study yesterday. After a brief chart review this morning to confirm via CTA report that there was no vascular malformation or tumor near the ICH, it appeared that FLACO met all inclusion and no exclusion criteria for the study. Dr Phillips also confirmed the patient as a candidate for the study.     After conferring with FLACO's nurse who confirmed her full orientation, I approached FLACO this morning around 930am in her room in the ICU to describe the study and assess her interest in participating. I explained the purpose of the study and what her role would involve now and over the course of 6 months.  I emphasized that it was not a treatment study, and that participation was completely voluntary. FLACO expressed interest but said she would like to discuss with her mother who was due to visit soon. I left 2 copies of the study consent form and we agreed that I would return around lunch time to discuss it further.      I returned to FLACO's room around 12:30 and reviewed the study consent form page by page with FLACO, her mother, and FLACO's daughter (by telephone). We reviewed the purpose of the study, procedures, risks/discomforts, benefits, alternatives, patient costs (none), patient stipends ($100 for each of 3 completed visits), illness/injury, withdrawal from the study, confidentiality, authorization to use and disclose health information for research purposes, study related communication and who to call for questions. Again, I emphasized that participation was completely voluntary. And should she enroll, she could refuse to answer any question or withdraw altogether at any time.      I allowed adequate time for questions from everyone, and then FLACO indicated that all questions were answered to her  satisfaction and she wanted to enroll in the study. Per study protocol, prior to signing the consent, we completed the Consent Comprehension Questionnaires for the [facility] study and the [facility] substudy. DK then signed and dated the study consent form in the presence of her mother who incidentally was also agreeable.  I left DK a blank copy of the consent and said I could provide a photocopy of the signed version after Dr Phillips or Dr Rodgers signed to acknowledge her enrollment.     We then completed the TICS-m questionnaire and by her score I confirmed that the patient could answer the forthcoming interview questions herself.       We then proceeded to complete a few of the [facility] Exam assessments. Then the patient indicated she was tired. Knowing that she would be having an MRI soon, I offered to stop at this point to let her rest, and then return tomorrow to complete the rest of the interview, assessments, and collect the blood samples. DK was agreeable to this plan.       Daisy VILLA RN  Research Coordinator

## 2021-04-15 ENCOUNTER — APPOINTMENT (OUTPATIENT)
Dept: CT IMAGING | Facility: HOSPITAL | Age: 51
End: 2021-04-15

## 2021-04-15 ENCOUNTER — TRANSCRIBE ORDERS (OUTPATIENT)
Dept: SLEEP MEDICINE | Facility: HOSPITAL | Age: 51
End: 2021-04-15

## 2021-04-15 ENCOUNTER — APPOINTMENT (OUTPATIENT)
Dept: NEUROLOGY | Facility: HOSPITAL | Age: 51
End: 2021-04-15

## 2021-04-15 DIAGNOSIS — Z01.818 OTHER SPECIFIED PRE-OPERATIVE EXAMINATION: Primary | ICD-10-CM

## 2021-04-15 PROCEDURE — 95813 EEG EXTND MNTR 61-119 MIN: CPT

## 2021-04-15 PROCEDURE — 97162 PT EVAL MOD COMPLEX 30 MIN: CPT

## 2021-04-15 PROCEDURE — 99232 SBSQ HOSP IP/OBS MODERATE 35: CPT | Performed by: NURSE PRACTITIONER

## 2021-04-15 PROCEDURE — 99232 SBSQ HOSP IP/OBS MODERATE 35: CPT | Performed by: PSYCHIATRY & NEUROLOGY

## 2021-04-15 PROCEDURE — 70450 CT HEAD/BRAIN W/O DYE: CPT

## 2021-04-15 PROCEDURE — 94799 UNLISTED PULMONARY SVC/PX: CPT

## 2021-04-15 PROCEDURE — 95813 EEG EXTND MNTR 61-119 MIN: CPT | Performed by: PSYCHIATRY & NEUROLOGY

## 2021-04-15 PROCEDURE — 25010000002 HYDRALAZINE PER 20 MG: Performed by: INTERNAL MEDICINE

## 2021-04-15 RX ORDER — ACETAMINOPHEN 325 MG/1
650 TABLET ORAL EVERY 6 HOURS PRN
Status: DISCONTINUED | OUTPATIENT
Start: 2021-04-15 | End: 2021-04-16 | Stop reason: HOSPADM

## 2021-04-15 RX ADMIN — ACETAMINOPHEN 650 MG: 325 TABLET ORAL at 20:07

## 2021-04-15 RX ADMIN — SODIUM CHLORIDE, PRESERVATIVE FREE 10 ML: 5 INJECTION INTRAVENOUS at 20:22

## 2021-04-15 RX ADMIN — LEVETIRACETAM 500 MG: 500 TABLET, FILM COATED ORAL at 09:11

## 2021-04-15 RX ADMIN — SODIUM CHLORIDE 5 MG/HR: 9 INJECTION, SOLUTION INTRAVENOUS at 19:34

## 2021-04-15 RX ADMIN — LEVETIRACETAM 500 MG: 500 TABLET, FILM COATED ORAL at 20:07

## 2021-04-15 RX ADMIN — LISINOPRIL 10 MG: 10 TABLET ORAL at 09:11

## 2021-04-15 RX ADMIN — HYDRALAZINE HYDROCHLORIDE 10 MG: 20 INJECTION, SOLUTION INTRAMUSCULAR; INTRAVENOUS at 18:14

## 2021-04-15 RX ADMIN — SODIUM CHLORIDE, PRESERVATIVE FREE 10 ML: 5 INJECTION INTRAVENOUS at 09:11

## 2021-04-16 VITALS
DIASTOLIC BLOOD PRESSURE: 77 MMHG | HEIGHT: 66 IN | TEMPERATURE: 98.1 F | HEART RATE: 68 BPM | RESPIRATION RATE: 16 BRPM | SYSTOLIC BLOOD PRESSURE: 142 MMHG | OXYGEN SATURATION: 97 % | BODY MASS INDEX: 30.53 KG/M2 | WEIGHT: 190 LBS

## 2021-04-16 PROCEDURE — 99233 SBSQ HOSP IP/OBS HIGH 50: CPT | Performed by: NURSE PRACTITIONER

## 2021-04-16 RX ORDER — LISINOPRIL 10 MG/1
10 TABLET ORAL
Qty: 30 TABLET | Refills: 1 | Status: SHIPPED | OUTPATIENT
Start: 2021-04-17 | End: 2021-05-14 | Stop reason: HOSPADM

## 2021-04-16 RX ADMIN — SODIUM CHLORIDE, PRESERVATIVE FREE 10 ML: 5 INJECTION INTRAVENOUS at 08:44

## 2021-04-16 RX ADMIN — LISINOPRIL 10 MG: 10 TABLET ORAL at 08:43

## 2021-04-17 ENCOUNTER — READMISSION MANAGEMENT (OUTPATIENT)
Dept: CALL CENTER | Facility: HOSPITAL | Age: 51
End: 2021-04-17

## 2021-04-17 NOTE — OUTREACH NOTE
Prep Survey      Responses   [facility] patient discharged from?  [facility]   Is LACE score < 7 ?  No   Emergency Room discharge w/ pulse ox?  No   Eligibility  Readm Mgmt   Discharge diagnosis  CVA   Does the patient have one of the following disease processes/diagnoses(primary or secondary)?  Stroke (TIA)   Does the patient have Home health ordered?  No   Is there a DME ordered?  No   Comments regarding appointments  see AVS   Medication alerts for this patient  lisinopril   Prep survey completed?  Yes          Samia Robin RN

## 2021-04-19 ENCOUNTER — TELEPHONE (OUTPATIENT)
Dept: NEUROSURGERY | Facility: CLINIC | Age: 51
End: 2021-04-19

## 2021-04-19 ENCOUNTER — READMISSION MANAGEMENT (OUTPATIENT)
Dept: CALL CENTER | Facility: HOSPITAL | Age: 51
End: 2021-04-19

## 2021-04-19 DIAGNOSIS — I61.9 INTRAPARENCHYMAL HEMORRHAGE OF BRAIN (HCC): Primary | ICD-10-CM

## 2021-04-19 NOTE — OUTREACH NOTE
Stroke Week 1 Survey      Responses   [facility] patient discharged from?  [place]   Does the patient have one of the following disease processes/diagnoses(primary or secondary)?  Stroke (TIA)   Week 1 attempt successful?  Yes   Call start time  1609   Call end time  1612   Discharge diagnosis  CVA   Meds reviewed with patient/caregiver?  Yes   Is the patient having any side effects they believe may be caused by any medication additions or changes?  No   Does the patient have all medications ordered at discharge?  Yes   Is the patient taking all medications as directed (includes completed medication regime)?  Yes   Comments regarding appointments  Hospital follow up with Dr. Lea (PCP) 5/5/21   Does the patient have a primary care provider?   Yes   Does the patient have an appointment with their PCP within 7 days of discharge?  Greater than 7 days   Nursing Interventions  Verified appointment date/time/provider   Has the patient kept scheduled appointments due by today?  N/A   Has home health visited the patient within 72 hours of discharge?  N/A   Psychosocial issues?  No   Does the patient require any assistance with activities of daily living such as eating, bathing, dressing, walking, etc.?  No   Does the patient have any residual symptoms from stroke/TIA?  No   Does the patient understand the diet ordered at discharge?  Yes   Did the patient receive a copy of their discharge instructions?  Yes   Nursing interventions  Reviewed instructions with patient   What is the patient's perception of their health status since discharge?  Improving   Nursing interventions  Nurse provided patient education   Is the patient able to teach back FAST for Stroke?  Yes   Is the patient/caregiver able to teach back the risk factors for a stroke?  High blood pressure-goal below 120/80, Sleep apnea, Physical inactivity and obesity, History of TIAs, High Cholesterol, Carotid or other artery disease   Is the  patient/caregiver able to teach back signs and symptoms related to disease process for when to call PCP?  Yes   Is the patient/caregiver able to teach back signs and symptoms related to disease process for when to call 911?  Yes   If the patient is a current smoker, are they able to teach back resources for cessation?  Not a smoker   Is the patient/caregiver able to teach back the hierarchy of who to call/visit for symptoms/problems? PCP, Specialist, Home health nurse, Urgent Care, ED, 911  Yes   Week 1 call completed?  Yes          Anny Marin RN

## 2021-04-19 NOTE — TELEPHONE ENCOUNTER
Patient was scheduled for 5/13/2021 due to her Colonoscopy. There is not a May APC schedule open yet.      ----- Message from Cleo Hackett sent at 4/15/2021  4:53 PM EDT -----  Regarding: FW: FU  Can you take this one? I am out tomorrow.  ----- Message -----  From: Stefanie Albert APRN  Sent: 4/15/2021   4:43 PM EDT  To: Cleo Hackett  Subject: FU                                               FU in 3 Weeks with [facility] can see an APC.    Please, check to see if patient has Neurology f/u in about 3-4 weeks for seizure.  If not she needs an outpatient neurology visit made for f/u as well.          /

## 2021-04-26 ENCOUNTER — TELEPHONE (OUTPATIENT)
Dept: NEUROLOGY | Facility: CLINIC | Age: 51
End: 2021-04-26

## 2021-04-26 NOTE — TELEPHONE ENCOUNTER
You saw pt earlier this month for intracerebral hemorrhage, right, cortical and new onset seizure - provoked. She is scheduled to f/u with you in July. Would you be willing to sign [place] papers for her? She saw Marisol in the hosp also, I can ask her.       Thank you

## 2021-04-26 NOTE — TELEPHONE ENCOUNTER
Caller: PT     Relationship: SELF     Best call back number: [phone]    What form or medical record are you requesting: FMLA     Who is requesting this form or medical record from you: PTS WORK     How would you like to receive the form or medical records (pick-up, mail, fax):   If fax, what is the fax number:   If mail, what is the address:   If pick-up, provide patient with address and location details  DOESN'T HAVE A FAX NUMBER HERE IS HER DIRECT NUMBER [phone] GERTRUDE VELASQUEZ     Timeframe paperwork needed: ASAP     Additional notes: NEEDING HER FMLA PAPERWORK FILLED OUT FOR WORK.   PLEASE ADVISE

## 2021-04-27 ENCOUNTER — READMISSION MANAGEMENT (OUTPATIENT)
Dept: CALL CENTER | Facility: HOSPITAL | Age: 51
End: 2021-04-27

## 2021-04-27 NOTE — OUTREACH NOTE
Stroke Week 2 Survey      Responses   [facility] patient discharged from?  [place]   Does the patient have one of the following disease processes/diagnoses(primary or secondary)?  Stroke (TIA)   Week 2 attempt successful?  No   Unsuccessful attempts  Attempt 1          Micaela Ramos LPN

## 2021-05-10 ENCOUNTER — TELEPHONE (OUTPATIENT)
Dept: GASTROENTEROLOGY | Facility: CLINIC | Age: 51
End: 2021-05-10

## 2021-05-13 ENCOUNTER — HOSPITAL ENCOUNTER (OUTPATIENT)
Dept: CT IMAGING | Facility: HOSPITAL | Age: 51
Discharge: HOME | End: 2021-05-13
Admitting: NEUROLOGICAL SURGERY

## 2021-05-13 DIAGNOSIS — I61.9 INTRAPARENCHYMAL HEMORRHAGE OF BRAIN (HCC): ICD-10-CM

## 2021-05-13 PROCEDURE — 70450 CT HEAD/BRAIN W/O DYE: CPT

## 2021-05-13 NOTE — PROGRESS NOTES
Subjective   Patient ID: Sonia Henning is a 50 y.o. female is here today for follow-up with same day CT head for intraparenchymal for hemorrhage of brain.     Today, Ms. Henning reports she is doing well. She denies any HA or dizziness. She does have blurry vision after looking at phone but was having same issue prior to stroke will schedule to see eye doctor's appointment soon.      History of Present Illness     Ms. Henning is a 50-year-old female with a history of hypertension who we encountered as a hospital consultation for an ovoid intraparenchymal hemorrhage involving the right parietal lobe with surrounding edema.  The hemorrhage measured 2.4 x 2.2 x 2.6 cm.  No evidence of hydrocephalus or midline shift.  The patient was hypertensive on arrival and there was also some mention of biting her tongue which was highly suspicious for seizure.  The patient states that she did not have a seizure and that she is not taking any antiseizure medications.  The patient states that her blood pressure medication was changed by her primary care provider.  Her blood pressure seems to be fairly well controlled but the patient understands the importance that she monitor the blood pressure at home for comparison and tracking.    The patient denies any headache, nausea, vomiting, mental status change, seizure activity, confusion or difficulty with balance.  She was evaluated by both the stroke neurology service as well as neurosurgery.  It was determined that the right parietal hemorrhage is not in the location where we typically see hypertensive hemorrhages.  The patient underwent MRI imaging of the brain with and without contrast during her hospitalization.  It did not show any thing of concern however the right parietal region had been blocked by the hemorrhage that occurred there.  The patient has since been discharged home.  She states that she has been doing well.  She is here today with a new head CT without contrast.      "The following portions of the patient's history were reviewed and updated as appropriate: allergies, current medications, past family history, past medical history, past social history, past surgical history and problem list.    Review of Systems   Neurological: Negative for dizziness, seizures, speech difficulty, light-headedness and headaches.       Objective     Vitals:    05/14/21 1324 05/14/21 1407   BP: 150/94 142/88   Pulse: 80    Temp: 98.3 °F (36.8 °C)    Weight: 86.2 kg (190 lb)    Height: 167.6 cm (66\")      Body mass index is 30.67 kg/m².      Physical Exam  Vitals reviewed.   Constitutional:       General: She is not in acute distress.     Appearance: Normal appearance. She is well-developed. She is not ill-appearing or diaphoretic.   HENT:      Head: Normocephalic and atraumatic.   Eyes:      General:         Right eye: No discharge.         Left eye: No discharge.      Extraocular Movements: Extraocular movements intact.      Conjunctiva/sclera: Conjunctivae normal.   Neck:      Trachea: No tracheal deviation.   Cardiovascular:      Rate and Rhythm: Normal rate.   Pulmonary:      Effort: Pulmonary effort is normal. No respiratory distress.   Abdominal:      General: There is no distension.      Palpations: Abdomen is soft.      Tenderness: There is no abdominal tenderness.   Musculoskeletal:         General: No tenderness. Normal range of motion.      Cervical back: Normal range of motion and neck supple.   Skin:     General: Skin is warm and dry.      Findings: No erythema.   Neurological:      Mental Status: She is alert and oriented to person, place, and time.      GCS: GCS eye subscore is 4. GCS verbal subscore is 5. GCS motor subscore is 6.      Sensory: No sensory deficit.      Motor: No weakness or abnormal muscle tone.      Coordination: Coordination normal.      Gait: Gait normal.      Comments: AA&O x 3.  Speech is normal.  Converses appropriately.  PERRL. EOM's intact. Face symmetric. " "Tongue midline without fasiculations or atrophy. Sensation equal and intact throughout the face.  Hearing is intact bilaterally to finger rustle.  Negative pronator drift.  No dysmetria. Gait is normal.     Psychiatric:         Behavior: Behavior is cooperative.         Thought Content: Thought content normal.       Neurologic Exam     Mental Status   Oriented to person, place, and time.       Assessment/Plan   Independent Review of Radiographic Studies:      I personally reviewed the images from the following studies.    I reviewed the CT images of the brain that were performed without contrast with Dr. Holcomb and the patient today in the office.  The CT head shows that the right parietal intracerebral hemorrhage has evolved.  No evidence of new hemorrhage, hydrocephalus or acute interval change.    Medical Decision Making:      I discussed the patient's above case with Dr. Holcomb as Dr. Sabillon was not available in the office at this time.  All imaging studies were reviewed regarding the right parietal intracerebral hemorrhage.  Previous MRI was performed which showed no evidence of tumor however there was still quite a bit of blood in the right parietal region at that time.  With that in mind, it would be impossible to conclude that there is no sign of hemorrhage beneath the blood in the right parietal region.  For this reason, and at the recommendation of Dr. Holcomb, a repeat MRI of the brain with and without contrast as well as an MRA of the brain without contrast was recommended.  The patient was not comfortable with any further imaging as she feared that multiple radiographic imaging would in someway harm her.  She feels that it is \"too much.\"      Despite my attempts to explain the above, the patient became more irritated.  I explained that these are recommended studies.  Now that the blood has resolved, repeating the MRI of the brain with and without contrast and getting an MRA of the brain would " be more diagnostic and reliable.  I assured the patient that she is in control of her own care.  I have ordered the studies but if she chooses not to move forward, that is her decision.  The patient was strongly encouraged to keep her follow-up appointment with stroke neurology, and her primary care provider for continued medical management for potential stroke as well as management of hypertension.  She verbalized understanding.      Diagnoses and all orders for this visit:    1. Intraparenchymal hemorrhage of brain (CMS/HCC) (Primary)  -     MRI Brain With & Without Contrast; Future  -     MRI Angiogram Head Without Contrast; Future      If patient chooses to proceed with the above studies, she will be seen afterward for further discussion of the results.  She was encouraged to call if she has any further questions or concerns.

## 2021-05-14 ENCOUNTER — OFFICE VISIT (OUTPATIENT)
Dept: NEUROSURGERY | Facility: CLINIC | Age: 51
End: 2021-05-14

## 2021-05-14 VITALS
SYSTOLIC BLOOD PRESSURE: 142 MMHG | HEART RATE: 80 BPM | TEMPERATURE: 98.3 F | HEIGHT: 66 IN | BODY MASS INDEX: 30.53 KG/M2 | DIASTOLIC BLOOD PRESSURE: 88 MMHG | WEIGHT: 190 LBS

## 2021-05-14 DIAGNOSIS — I61.9 INTRAPARENCHYMAL HEMORRHAGE OF BRAIN (HCC): Primary | ICD-10-CM

## 2021-05-14 PROCEDURE — 99212 OFFICE O/P EST SF 10 MIN: CPT | Performed by: NURSE PRACTITIONER

## 2021-05-14 RX ORDER — AMLODIPINE BESYLATE AND BENAZEPRIL HYDROCHLORIDE 5; 20 MG/1; MG/1
1 CAPSULE ORAL DAILY
COMMUNITY
Start: 2021-05-11

## 2021-05-17 ENCOUNTER — TELEPHONE (OUTPATIENT)
Dept: NEUROLOGY | Facility: CLINIC | Age: 51
End: 2021-05-17

## 2021-05-17 NOTE — TELEPHONE ENCOUNTER
Caller: PT     Relationship: SELF     Best call back number: [phone]    Caller requesting test results: PT     What test was performed: CT SCAN     When was the test performed: 5/13/21    Where was the test performed: [facility]     Additional notes: PT WOULD LIKE A DOCTOR TO CALL HER BACK AND READ HER THE RESULTS OF HER MRI NOT AN NURSE PRACTITIONER. SHE NEEDS TO KNOW IF SHE CAN GO BACK TO WORK ETC      PLEASE ADVISE.

## 2021-05-18 ENCOUNTER — TELEPHONE (OUTPATIENT)
Dept: NEUROSURGERY | Facility: CLINIC | Age: 51
End: 2021-05-18

## 2021-05-18 ENCOUNTER — TELEPHONE (OUTPATIENT)
Dept: NEUROLOGY | Facility: CLINIC | Age: 51
End: 2021-05-18

## 2021-05-18 NOTE — TELEPHONE ENCOUNTER
Caller: TOBY TOLLIVER    Relationship to patient: PT/SELF    Best call back number: [phone]    Chief complaint: PT STATES THAT SHE WAS SEEN ON 05/14/2021. PT STATES SHE DID NOT GET HER QUESTIONS ANSWERED ON HER VISIT WITH GABRIELLE BARR AND IS REQUESTING TO BE SCHEDULED TO SEE A MD TO DISCUSS HER PREVIOUS CT RESULTS. PT IS NOT SATISFIED WITH THE CARE DURING THE VISIT.    Type of visit: FOLLOW UP    Requested date: ASAP    If rescheduling, when is the original appointment: LAST SEEN ON 05/14/2021    Additional notes: PLEASE CONTACT PT REGARDING THIS MATTER, QUESTIONS OR CONCERNS.    THANK YOU!

## 2021-05-18 NOTE — TELEPHONE ENCOUNTER
Attempted to reach patient.  Call went to voicemail.  Patient was already scheduled to see Dr. Phillips on 7/28/2021.  Left appt information on voicemail.

## 2021-05-18 NOTE — TELEPHONE ENCOUNTER
Caller: Sonia Henning    Relationship to patient: Self    Best call back number: [phone]    New or established patient?  [x] New  [] Established    Date of discharge: 04.16.21    Facility [facility] from:[facility]        Length of stay (If applicable): 3 DAYS     Specialty Only: Did you see a [facility] health provider?    [x] Yes  [] No  If so, who? TAB JOHNSON     PT WAS IN HOSPITAL AND WAS TOLD TO FOLLOW  UP WITH NEURO.  SHE HAS APPT ON 28 WITH DR AG.WILL HE BE FOLLOWING HER IN OFFICE ?? IF NOT  SHE WANTS TO SEE A DOCTOR NOT A APRN.  IF DR AG IS NOT GOING TO SEE ALL THE TIME THEN PREFERS DR. SANCHEZ OR DR Chyna CABA.   TAB JOHNSON TALKED TO PT. ABOUT A STUDY IN HOSPITAL HOWEVER SHE STILL WANTS TO SEE A DOCTOR TO F.U.  PT HAS NOT BEEN TOLD WHEN SHE CAN GO BACK TO WORK , ALSO [employer] PAPERS WERE TO BE FILLED OUT BY OFFICE AS WELL . SHE SAID HER WORK WAS TO SEND OVER.

## 2021-05-19 NOTE — TELEPHONE ENCOUNTER
Caller: Sonia Henning    Relationship: Self    Best call back number: [phone]    What is the best time to reach you: N/A    Who are you requesting to speak with (clinical staff, provider,  specific staff member): PRACTICE MANAGER    Do you know the name of the person who called: N/A    What was the call regarding: PATIENT CALLED AGAIN TO CHECK THE STATUS OF PREV CONCERN STATED YESTERDAYS. UNABLE TO ASSIST AT THIS TIME. ATTEMPTED TO WARM TRANSFER AT 10:12AM BUT NO ANSWER.    PATIENT CAN BE CONTACTED WITH FURTHER ASSISTANCE.    Do you require a callback: YES

## 2021-05-19 NOTE — TELEPHONE ENCOUNTER
Called pt to confirm Dr Bartlett has completed FMLA through 07/28/21, appt date with Dr Phillips. Pt did not want paperwork to reflect return that late, she expressed she had to return in June. I explained, per Dr Phillips, he completed based on his  consult in hosp for seizure. Pt would need to ask Neurosurgery to complete if pt was wanting to return sooner. Pt requested we shed the paperwork and she will call Neurosurgery to have them complete new paperwork.

## 2021-05-19 NOTE — TELEPHONE ENCOUNTER
Pt called and would like to speak to a physician about her CT results that she had 5/13/21 not a NP. She did not get questions answered at last visit to her understanding.     She would like to hear from  about this.

## 2021-05-19 NOTE — TELEPHONE ENCOUNTER
Patient called the office and is requesting a sooner appointment with Dr. Phillips.  There are no sooner appointments available at this time, but she has been added to his wait list.      Patient also states her employer never received FMLA paper work from us.  Papers were located and placed in Dr. Phillips's office.    Also reviewed driving restrictions and seizure precautions with patient as she continues to express concerns about when to return to work and drive.

## 2021-05-20 ENCOUNTER — OFFICE VISIT (OUTPATIENT)
Dept: NEUROSURGERY | Facility: CLINIC | Age: 51
End: 2021-05-20

## 2021-05-20 DIAGNOSIS — I61.9 INTRAPARENCHYMAL HEMORRHAGE OF BRAIN (HCC): Primary | ICD-10-CM

## 2021-05-20 PROCEDURE — 99443 PR PHYS/QHP TELEPHONE EVALUATION 21-30 MIN: CPT | Performed by: NEUROLOGICAL SURGERY

## 2021-05-20 NOTE — TELEPHONE ENCOUNTER
I spoke with patient and offered her a telephone visit with Dr. LANG. She said she would like to go over CT results and discuss gong back to work. I added her to Dr. LANG schedule today.

## 2021-05-20 NOTE — PROGRESS NOTES
Subjective   Patient ID: Sonia Henning is a 50 y.o. female is here today for follow-up. Patient was last seen 5/14/21 by SARA Pettit on 5/14/21 for intraparenchymal hemorrhage of brain.    You have chosen to receive care through a telephone visit. Do you consent to use a telephone visit for your medical care today? Yes;    Patient reports today for results of CT and to return to work.    This was a televisit from the hospital lasting 21 minutes.  The patient was at home.  This was a woman who had a right posterior parietal intracerebral hemorrhage that was atypical in the sense that it really did not look to me like a hypertensive hemorrhage.  She presented with headache and with a probable seizure.  She was seen in follow-up a few days ago by our nurse practitioner and the blood had reabsorbed.  Her CT angiogram of the head done during the hospitalization did not show any evidence of any aneurysm or arteriovenous malformation.  The initial brain MRI done during the hospitalization was obscured by the presence of the blood but there was some edema which raised the possibility that there could be an underlying hemorrhagic tumor.  I stressed to her that that possibility still remains and we have not yet ruled that out.  But now that the blood has resolved this is the opportune time to get the brain MRI.  She was resistant to getting the MRI as documented in my nurses notes a few days ago.  She was likewise not inclined to do it but I explained to her the reasoning behind it and she seemed to agree at the end of the televisit that it was important to get it and so we will order it and have her come to see me specifically.  I would not release her to work until we at least get that piece of information and we know diagnostically what we are dealing with.  I told her there is a possibility that the MRI does not show any tumor in which case we would either conclude that the etiology is unknown or this is an  atypical hypertensive hemorrhage.        History of Present Illness    The following portions of the patient's history were reviewed and updated as appropriate: allergies, current medications, past family history, past medical history, past social history, past surgical history and problem list.    Review of Systems        Objective     There were no vitals filed for this visit.  There is no height or weight on file to calculate BMI.      Physical Exam   Deferred  Neurologic Exam   Deferred        Assessment/Plan   Independent Review of Radiographic Studies:      I personally reviewed the images from the following studies.    The CT scan done on 5/13/2021 shows evolution and resolution of the intraparenchymal hemorrhage in the right parietal region.  Agree with the report.        Medical Decision Making:      The patient has agreed to an MRI of the brain.  We will do it with and without contrast and have her come back to see me to discuss it.  She will stay off of work until she can discuss the results of the study with me.      Diagnoses and all orders for this visit:    1. Intraparenchymal hemorrhage of brain (CMS/HCC) (Primary)  -     MRI Brain With & Without Contrast; Future      Return in about 1 week (around 5/27/2021) for After MRI.

## 2021-05-21 ENCOUNTER — TELEPHONE (OUTPATIENT)
Dept: NEUROLOGY | Facility: CLINIC | Age: 51
End: 2021-05-21

## 2021-05-21 NOTE — TELEPHONE ENCOUNTER
PT IS ASKING ABOUT THIS PAPER WORK THAT NEEDED TO BE COMPLETED AND WOULD LIKE TO KNOW IF SHE CAN COME UP WITH THE PAPERWORK AND HAVE THAT COMPLETED WHILE SHE WAITS. PT STATES THAT SHE DIDN'T WANT SEIZURE LISTED ON THE PPW AS THEY WEREN'T SURE THAT IS WHAT HAPPENED. PLEASE REVIEW AND ADVISE.

## 2021-05-24 NOTE — TELEPHONE ENCOUNTER
Dr. Phillips,     This patient had a visit with SARA Pettit on 5/14/21 and had a televisit with Dr. Holcomb on 5/20/21.  She is now asking if you would review the CT.  Do you mind to review?    Thanks.

## 2021-05-24 NOTE — TELEPHONE ENCOUNTER
Spoke with patient.  Reviewed Dr. Phillips's message regarding CT results.  Also discussed paperwork with her.  Dr. Phillips completed the paperwork last week and she asked Malgorzata to not send in the paperwork because it was for seizures.  She was told that if she would need to have FMLA filled out by neurosurgery for the ICH.    Patient continues to insist that she did not have a seizure and states she is going to call the hospital to have her records changed.  Reviewed hospital notes with her regarding loss of time,  urinary incontinence, and an episode of tongue biting that lead to the question of a possible seizure. Explained that the notes do state that this was an isolated event but because of this she still is under seizure precautions.

## 2021-06-10 ENCOUNTER — HOSPITAL ENCOUNTER (OUTPATIENT)
Dept: MRI IMAGING | Facility: HOSPITAL | Age: 51
Discharge: HOME | End: 2021-06-10
Admitting: NEUROLOGICAL SURGERY

## 2021-06-10 DIAGNOSIS — I61.9 INTRAPARENCHYMAL HEMORRHAGE OF BRAIN (HCC): ICD-10-CM

## 2021-06-10 PROCEDURE — 70553 MRI BRAIN STEM W/O & W/DYE: CPT

## 2021-06-10 PROCEDURE — 0 GADOBENATE DIMEGLUMINE 529 MG/ML SOLUTION: Performed by: NEUROLOGICAL SURGERY

## 2021-06-10 PROCEDURE — A9577 INJ MULTIHANCE: HCPCS | Performed by: NEUROLOGICAL SURGERY

## 2021-06-10 RX ADMIN — GADOBENATE DIMEGLUMINE 18 ML: 529 INJECTION, SOLUTION INTRAVENOUS at 17:49

## 2021-06-13 NOTE — PROGRESS NOTES
I am forwarding the MRI result to you since you saw this patient last. Your note states that she is to f/u with you after the MRI but I see no appointment. She will need to be seen this week. I have also sent this to Chiquis so that she can get this patient on your schedule.

## 2021-06-14 ENCOUNTER — TELEPHONE (OUTPATIENT)
Dept: NEUROSURGERY | Facility: CLINIC | Age: 51
End: 2021-06-14

## 2021-06-14 NOTE — TELEPHONE ENCOUNTER
Caller: Sonia Henning    Relationship: Self    Best call back number: [phone]    Caller requesting test results: PT    What test was performed:MRI    When was the test performed: 6/10/2021    Where was the test performed: [facility]    Additional notes: PT CALLED AND WANTED TO KNOW IF DR LANG HAD SEEN HER TEST RESULTS AND GET AN UPDATE    PLEASE CALL PT AND ADVISE     THANK YOU

## 2021-06-16 NOTE — TELEPHONE ENCOUNTER
I spoke with Sonia Henning and informed her of her MRI results per Dr. Holcomb.  She asked about returning to work and was displeased that I could not give her an answer.  I did consult with Valentina Sheridan and the agreement was made for a tele visit on 6/22/21 @ 3:30.  I called and left a message for the patient informing her.

## 2021-06-17 NOTE — PROGRESS NOTES
Subjective   Patient ID: Sonia Henning is a 51 y.o. female is here today for follow-up. Patient was last seen 5/20/21 for Intraparenchymal hemorrhage of brain.  This was a tele visit.    6/10/21 MRI Brain [facility]    You have chosen to receive care through a telephone visit. Do you consent to use a telephone visit for your medical care today? Yes    Patient reports today to discuss returning to work.     This was a televisit from my office lasting 11 minutes.  The patient was at home.  2 months ago she came in with a hemorrhage in the right parieto-occipital region that was atypical for hypertensive hemorrhage.  The follow-up brain MRI did not show any obvious evidence of any tumor or vascular malformation but the radiologist mentioned that there was still some blood products and it was hard to rule out those 2 things definitively.  There was also decreased edema.  This is probably an atypical hypertensive hemorrhage.  She is taking her blood pressure medicines and is followed up with her PCP.  But I did tell her that the radiologist recommended and I think it is prudent to get another MRI in about 3 months.  She is agreed to do so.  She works for [employer].  She wants to return to work and feels fine.  I think that is reasonable to do we will give her the clearance to do that.  Apparently there is a lot of paperwork that is required from [employer] and will help her with that.        History of Present Illness    The following portions of the patient's history were reviewed and updated as appropriate: allergies, current medications, past family history, past medical history, past social history, past surgical history and problem list.    Review of Systems        Objective     There were no vitals filed for this visit.  There is no height or weight on file to calculate BMI.      Physical Exam   Deferred  Neurologic Exam   Deferred        Assessment/Plan   Independent Review of Radiographic Studies:      I personally  reviewed the images from the following studies.    I reviewed the brain MRI done on 6/10/2021 which shows further expected evolutionary changes within the hemorrhage bed.  There is no obvious evidence of a tumor or vascular malformation but there was still some mild blood products and contrast enhancement could not be completely ruled out.  Decreased edema.  Agree with the report.        Medical Decision Making:      From my standpoint she can return to work and will fill out with a paperwork as required by [employer].  She is agreed to come back to see me face-to-face in 3 months after another MRI to confirm the lack of any evidence of any neoplastic problem or vascular malformation.      Diagnoses and all orders for this visit:    1. Intraparenchymal hemorrhage of brain (CMS/HCC) (Primary)  -     MRI Brain With & Without Contrast; Future      Return in about 3 months (around 9/22/2021) for Face-to-face.

## 2021-06-18 ENCOUNTER — TELEPHONE (OUTPATIENT)
Dept: GASTROENTEROLOGY | Facility: CLINIC | Age: 51
End: 2021-06-18

## 2021-06-22 ENCOUNTER — OFFICE VISIT (OUTPATIENT)
Dept: NEUROSURGERY | Facility: CLINIC | Age: 51
End: 2021-06-22

## 2021-06-22 DIAGNOSIS — I61.9 INTRAPARENCHYMAL HEMORRHAGE OF BRAIN (HCC): Primary | ICD-10-CM

## 2021-06-22 PROCEDURE — 99442 PR PHYS/QHP TELEPHONE EVALUATION 11-20 MIN: CPT | Performed by: NEUROLOGICAL SURGERY

## 2021-07-07 ENCOUNTER — TELEPHONE (OUTPATIENT)
Dept: NEUROLOGY | Facility: CLINIC | Age: 51
End: 2021-07-07

## 2021-07-07 ENCOUNTER — TELEPHONE (OUTPATIENT)
Dept: GASTROENTEROLOGY | Facility: CLINIC | Age: 51
End: 2021-07-07

## 2021-07-07 NOTE — TELEPHONE ENCOUNTER
PT called in to change GI doctor to Dr. Hill and wanted to know her availability, advised that PT has previously seen Dr. Leong and that she would not be able to see another GI within practice. PT was not interested in scheduling appt with Dr. Leong, she stated she wants a female GI doctor.

## 2021-07-19 ENCOUNTER — TELEPHONE (OUTPATIENT)
Dept: NEUROLOGY | Facility: CLINIC | Age: 51
End: 2021-07-19

## 2021-07-19 NOTE — TELEPHONE ENCOUNTER
Spoke with patient to let her know that appt w/ Dr. Phillips will be rescheduled and that we will call back with a new appt date and time.

## 2021-07-28 ENCOUNTER — DOCUMENTATION (OUTPATIENT)
Dept: NEUROLOGY | Facility: CLINIC | Age: 51
End: 2021-07-28

## 2021-07-28 ENCOUNTER — OFFICE VISIT (OUTPATIENT)
Dept: NEUROLOGY | Facility: CLINIC | Age: 51
End: 2021-07-28

## 2021-07-28 ENCOUNTER — RESEARCH ENCOUNTER (OUTPATIENT)
Dept: CARDIOLOGY | Facility: CLINIC | Age: 51
End: 2021-07-28

## 2021-07-28 VITALS
WEIGHT: 194 LBS | OXYGEN SATURATION: 100 % | HEART RATE: 51 BPM | BODY MASS INDEX: 31.18 KG/M2 | SYSTOLIC BLOOD PRESSURE: 128 MMHG | DIASTOLIC BLOOD PRESSURE: 78 MMHG | HEIGHT: 66 IN

## 2021-07-28 DIAGNOSIS — I61.9 INTRAPARENCHYMAL HEMORRHAGE OF BRAIN (HCC): Primary | ICD-10-CM

## 2021-07-28 DIAGNOSIS — I10 ESSENTIAL HYPERTENSION: ICD-10-CM

## 2021-07-28 DIAGNOSIS — G47.33 OBSTRUCTIVE SLEEP APNEA: ICD-10-CM

## 2021-07-28 PROCEDURE — 99214 OFFICE O/P EST MOD 30 MIN: CPT | Performed by: PSYCHIATRY & NEUROLOGY

## 2021-07-28 RX ORDER — ASPIRIN 81 MG/1
81 TABLET ORAL DAILY
Qty: 30 TABLET | Refills: 6 | Status: SHIPPED | OUTPATIENT
Start: 2021-07-28

## 2021-07-28 NOTE — RESEARCH
Research Study: [facility]  Subject Study ID#: [id number]  Subject Initials: FLACO SAM enrolled in the [facility] study in April 2021 when she was diagnosed with ICH. Today I saw her in the [facility] office for her 3-month follow up visit for the study. We completed the follow up phlebotomy, VINCENT Exam, and the VINCENT and GERS Follow Up Questionnaires.  I also gave FLACO a photocopy of her study consent form with all signatures in place.      I have notified our Research Finance personnel to dispense/mail the visit stipend to FLACO. I asked the patient to call me if she does not receive it in the next 30 days. FLACO and I agreed that I would call her in a couple months to arrange her final study visit.     Daisy VILLA RN  Research Coordinator

## 2021-07-28 NOTE — PROGRESS NOTES
"DOS: 2021  NAME: Sonia Henning   : 1970  PCP: Africa Lea MD  Chief Complaint   Patient presents with   • Stroke       Chief complaint: Stroke follow-up  Subjective: 51-year-old female with hypertension, sleep apnea, seen by me in the hospital for right parietal intracerebral hemorrhage.  At that time she developed sudden onset of left-sided numbness and then had a period of loss time where she woke up with generalized aching and a tongue bite.  She was thought to have had a seizure.  In the hospital she had some mild sensory symptoms but otherwise quickly recovered back to her neurologic baseline.  She was treated for hypertension and left the hospital.  We did not treat her with seizure medication due to her single provoked seizure.  She has returned to work and back to driving at this point.  She is doing well with no issues.  She is requesting clearance to return to regular exercise.  She has had one follow-up MRI with neurosurgery which showed interval improvement of the hemorrhage with no underlying lesion.    Objective:  Vital signs: /78 (BP Location: Left arm, Patient Position: Sitting, Cuff Size: Adult)   Pulse 51   Ht 167.6 cm (65.98\")   Wt 88 kg (194 lb)   SpO2 100%   BMI 31.33 kg/m²    Exam:   General:Vitals reviewed  Mental status: Awake, alert, oriented, no aphasia or neglect, memory, concentration normal  Cranial nerves: Pupils 3 mm, reactive, extract movements intact, no facial droop  Motor: 5/5 throughout, normal tone  Gait: Normal station, no ataxia    ROS    Laboratory results:  Lab Results   Component Value Date     (H) 2021            Review and interpretation of imaging: I personally reviewed her most recent follow-up MRI which shows interval near resolution of her intracerebral hemorrhage.  She has very advanced hypertensive white matter disease for her age    Workup to date: Spontaneous right parietal intracerebral hemorrhage secondary to " hypertension, presented with single focal seizure    MRI right parietal intracerebral hemorrhage, severe white matter disease  EEG: Mild to moderate slowing, no epileptiform discharges or seizures    Diagnoses:  Intracerebral hemorrhage, right cortical  Essential hypertension  Obstructive sleep apnea  Provoked seizure    Assessment/comments: She has done very well with no residual symptoms after her ICH.  She has severe white matter disease from hypertension which I discussed in detail with her today.  She had a single provoked seizure after ICH but no subsequent events, we have not started AEDs and she is cleared for all activities including driving from a neurology standpoint.    Plan:  1.  Consider starting low-dose aspirin.  I will discuss this with neurosurgery  2.  Continue monitoring blood pressure daily, goal less than 140/80  3.  Compliant with CPAP for DIOGO  4.  Follow-up with me as needed    I spent a total of 35 minutes on this clinical encounter including chart/records review, review of laboratory data, personal review of imaging studies, patient counseling, and care coordination.

## 2021-10-04 ENCOUNTER — HOSPITAL ENCOUNTER (OUTPATIENT)
Dept: MRI IMAGING | Facility: HOSPITAL | Age: 51
Discharge: HOME | End: 2021-10-04
Admitting: NEUROLOGICAL SURGERY

## 2021-10-04 DIAGNOSIS — I61.9 INTRAPARENCHYMAL HEMORRHAGE OF BRAIN (HCC): ICD-10-CM

## 2021-10-04 PROCEDURE — A9577 INJ MULTIHANCE: HCPCS | Performed by: NEUROLOGICAL SURGERY

## 2021-10-04 PROCEDURE — 70553 MRI BRAIN STEM W/O & W/DYE: CPT

## 2021-10-04 PROCEDURE — 82565 ASSAY OF CREATININE: CPT

## 2021-10-04 PROCEDURE — 0 GADOBENATE DIMEGLUMINE 529 MG/ML SOLUTION: Performed by: NEUROLOGICAL SURGERY

## 2021-10-04 RX ADMIN — GADOBENATE DIMEGLUMINE 17 ML: 529 INJECTION, SOLUTION INTRAVENOUS at 13:40

## 2021-10-05 LAB — CREAT BLDA-MCNC: 1.2 MG/DL (ref 0.6–1.3)

## 2021-10-06 ENCOUNTER — OFFICE VISIT (OUTPATIENT)
Dept: NEUROSURGERY | Facility: CLINIC | Age: 51
End: 2021-10-06

## 2021-10-06 VITALS
WEIGHT: 197.2 LBS | DIASTOLIC BLOOD PRESSURE: 78 MMHG | TEMPERATURE: 99.1 F | HEIGHT: 66 IN | BODY MASS INDEX: 31.69 KG/M2 | SYSTOLIC BLOOD PRESSURE: 134 MMHG

## 2021-10-06 DIAGNOSIS — I61.9 INTRAPARENCHYMAL HEMORRHAGE OF BRAIN (HCC): Primary | ICD-10-CM

## 2021-10-06 PROCEDURE — 99213 OFFICE O/P EST LOW 20 MIN: CPT | Performed by: NEUROLOGICAL SURGERY

## 2021-10-06 NOTE — PROGRESS NOTES
"Subjective   Patient ID: Sonia Henning is a 51 y.o. female is here today for follow-up.    She was last seen 6/22/21 for Intraparenchymal hemorrhage of brain as a tele visit.    10/4/21   MRI Brain with & without  [facility]      Today patient reports she is here to discuss imaging    This is a patient who presented with a probably atypical hypertensive hemorrhage in the right occipital region.  This was a follow-up scan to make sure we were not dealing with a tumor or vascular malformation.  The follow-up MRI just done recently was negative for that.  She is back at work.  She has some tenderness in the right maxillary region and near the nose which is probably sinus related and she is working with the ENT doctor about that.  But her blood pressure is under decent control, she does not smoke, I told her at this point we can keep it open-ended and urged her to work with her primary care physician to decrease her stroke risk factors.    History of Present Illness    The following portions of the patient's history were reviewed and updated as appropriate: allergies, current medications, past family history, past medical history, past social history, past surgical history and problem list.    Review of Systems   Constitutional: Negative for fever.   All other systems reviewed and are negative.          Objective     Vitals:    10/06/21 1506   BP: 134/78   Temp: 99.1 °F (37.3 °C)   Weight: 89.4 kg (197 lb 3.2 oz)   Height: 167.6 cm (65.98\")     Body mass index is 31.85 kg/m².      Physical Exam  Constitutional:       Appearance: She is well-developed.   HENT:      Head: Normocephalic and atraumatic.   Eyes:      Extraocular Movements: EOM normal.      Conjunctiva/sclera: Conjunctivae normal.      Pupils: Pupils are equal, round, and reactive to light.   Neck:      Vascular: No carotid bruit.   Neurological:      Mental Status: She is oriented to person, place, and time.      Coordination: Finger-Nose-Finger Test and Heel to " Jerry Test normal.      Gait: Gait is intact.      Deep Tendon Reflexes:      Reflex Scores:       Tricep reflexes are 2+ on the right side and 2+ on the left side.       Bicep reflexes are 2+ on the right side and 2+ on the left side.       Brachioradialis reflexes are 2+ on the right side and 2+ on the left side.       Patellar reflexes are 2+ on the right side and 2+ on the left side.       Achilles reflexes are 2+ on the right side and 2+ on the left side.  Psychiatric:         Speech: Speech normal.       Neurologic Exam     Mental Status   Oriented to person, place, and time.   Registration of memory: Good recent and remote memory.   Attention: normal. Concentration: normal.   Speech: speech is normal   Level of consciousness: alert  Knowledge: consistent with education.     Cranial Nerves     CN II   Visual fields full to confrontation.   Visual acuity: normal    CN III, IV, VI   Pupils are equal, round, and reactive to light.  Extraocular motions are normal.     CN V   Facial sensation intact.   Right corneal reflex: normal  Left corneal reflex: normal    CN VII   Facial expression full, symmetric.   Right facial weakness: none  Left facial weakness: none    CN VIII   Hearing: intact    CN IX, X   Palate: symmetric    CN XI   Right sternocleidomastoid strength: normal  Left sternocleidomastoid strength: normal    CN XII   Tongue: not atrophic  Tongue deviation: none    Motor Exam   Muscle bulk: normal  Right arm tone: normal  Left arm tone: normal  Right leg tone: normal  Left leg tone: normal    Strength   Strength 5/5 except as noted.     Sensory Exam   Light touch normal.     Gait, Coordination, and Reflexes     Gait  Gait: normal    Coordination   Finger to nose coordination: normal  Heel to shin coordination: normal    Reflexes   Right brachioradialis: 2+  Left brachioradialis: 2+  Right biceps: 2+  Left biceps: 2+  Right triceps: 2+  Left triceps: 2+  Right patellar: 2+  Left patellar: 2+  Right  achilles: 2+  Left achilles: 2+  Right : 2+  Left : 2+          Assessment/Plan   Independent Review of Radiographic Studies:      I personally reviewed the images from the following studies.    Viewed the MRI done on 10/4/2021 which shows essentially near complete resolution of the blood in the occipital region.  There is no evidence of tumor or vascular malformation.  I think this time the conclusion is more definitive compared to last time.  Agree with the report.        Medical Decision Making:      At this time, we can keep it open-ended.  I urged her to work closely with her primary care physician decreasing her risk for intracranial events such as blood pressure control, control of cholesterol and triglycerides, and lack of smoking.      Diagnoses and all orders for this visit:    1. Intraparenchymal hemorrhage of brain (HCC) (Primary)      Return if symptoms worsen or fail to improve.

## 2022-05-24 ENCOUNTER — TELEPHONE (OUTPATIENT)
Dept: NEUROLOGY | Facility: CLINIC | Age: 52
End: 2022-05-24

## 2022-05-24 NOTE — TELEPHONE ENCOUNTER
Attempted to reach patient to reschedule the upcoming appointment with Dr. Phillips.  Left message with new appointment information.  Also mailing letter with the appointment information.

## 2022-06-14 ENCOUNTER — TELEPHONE (OUTPATIENT)
Dept: NEUROLOGY | Facility: CLINIC | Age: 52
End: 2022-06-14

## 2022-06-14 NOTE — TELEPHONE ENCOUNTER
Left message appt with Dr Phillips on 06/15/22 will need to be r/s. Dr Phillips had to unexpectedly go on call this week. His nurse will call to r/s when she returns.

## 2022-08-05 ENCOUNTER — TELEPHONE (OUTPATIENT)
Dept: NEUROLOGY | Facility: CLINIC | Age: 52
End: 2022-08-05

## 2022-08-05 ENCOUNTER — HOSPITAL ENCOUNTER (OUTPATIENT)
Dept: CT IMAGING | Facility: HOSPITAL | Age: 52
Discharge: HOME | End: 2022-08-05
Admitting: NEUROLOGICAL SURGERY

## 2022-08-05 ENCOUNTER — TELEPHONE (OUTPATIENT)
Dept: NEUROSURGERY | Facility: CLINIC | Age: 52
End: 2022-08-05

## 2022-08-05 DIAGNOSIS — I61.9 INTRAPARENCHYMAL HEMORRHAGE OF BRAIN: Primary | ICD-10-CM

## 2022-08-05 DIAGNOSIS — I61.9 INTRAPARENCHYMAL HEMORRHAGE OF BRAIN: ICD-10-CM

## 2022-08-05 PROCEDURE — 70450 CT HEAD/BRAIN W/O DYE: CPT

## 2022-08-05 NOTE — TELEPHONE ENCOUNTER
Gely spoke to the patient and advised of the location of the appointment and she could go home after the scan and Dr. Holcomb will review.

## 2022-08-05 NOTE — TELEPHONE ENCOUNTER
She is reporting discomfort/ache on the right side where the bleed was. She denies any dizziness, lightheadedness, nausea or vomiting. She denies any headache. She reports she has not hit her head at all. Unsure if this is related to not getting enough sleep or how she slept. She denies any vision changes.     Patient was seen in 2021 as a telephone visit after an intraparenchymal bleed, following a brain MRI.

## 2022-08-05 NOTE — TELEPHONE ENCOUNTER
Provider: NIMISHA THORNTON  Caller: TOBY TOLLIVER  Relationship to Patient: PATIENT  Pharmacy: N/A  Phone Number: [phone]  Reason for Call: PAIN IN AREA WHERE BRAIN BLEED WAS; TURNS HEAD HAS A DULL ACHE IN THAT AREA. LITTLE PRESSURE ON RIGHT SIDE.    SHOULD SHE BE DOING SOMETHING OR SEE SOMEONE?    NEXT APPT. 11/23/22    PLEASE ADVISE.    THANK YOU

## 2022-08-05 NOTE — TELEPHONE ENCOUNTER
Per verbal discussion with Dr. Holcomb, he would like to do a STAT head CT for the patient.     I called the patient to make her aware of the plan and told her once it is scheduled I will let her know. We need to start the authorization for it.

## 2022-08-05 NOTE — TELEPHONE ENCOUNTER
Attempted to reach patient to assess for any new signs/symptoms of stroke.  Left message to call back.    Dr. Phillips, do you have any recommendations for this patient based off the message from the [facility]?  Thank you!

## 2022-08-08 NOTE — TELEPHONE ENCOUNTER
"Per Dr. Holcomb, \"Tell her the CT looks fine. No evidence of any bleeding.\"    LVM for the patient advising of imaging results.   "

## 2022-08-08 NOTE — TELEPHONE ENCOUNTER
Per Dr. Phillips:    No. Looks like she visited the ED to have a head CT which was negative. Patients who have suffered an ICH can develop migraine later as a complication. If her headaches persist or worsen she needs to see me or one of the Nps in the office.     Called patient to discuss.  She voiced understanding though she does feel this is not related to a migraine.

## 2022-10-26 ENCOUNTER — TELEPHONE (OUTPATIENT)
Dept: NEUROLOGY | Facility: CLINIC | Age: 52
End: 2022-10-26

## 2022-12-08 ENCOUNTER — OFFICE VISIT (OUTPATIENT)
Dept: NEUROLOGY | Facility: CLINIC | Age: 52
End: 2022-12-08

## 2022-12-08 VITALS
BODY MASS INDEX: 32.95 KG/M2 | OXYGEN SATURATION: 98 % | DIASTOLIC BLOOD PRESSURE: 76 MMHG | WEIGHT: 205 LBS | SYSTOLIC BLOOD PRESSURE: 122 MMHG | HEIGHT: 66 IN | HEART RATE: 72 BPM

## 2022-12-08 DIAGNOSIS — G47.33 OSA (OBSTRUCTIVE SLEEP APNEA): Primary | ICD-10-CM

## 2022-12-08 DIAGNOSIS — I10 ESSENTIAL HYPERTENSION: ICD-10-CM

## 2022-12-08 DIAGNOSIS — I61.9 CHRONIC INTRACEREBRAL HEMORRHAGE: ICD-10-CM

## 2022-12-08 PROCEDURE — 99213 OFFICE O/P EST LOW 20 MIN: CPT | Performed by: PSYCHIATRY & NEUROLOGY

## 2022-12-08 RX ORDER — METHYLPREDNISOLONE 4 MG/1
TABLET ORAL
COMMUNITY
Start: 2022-12-05

## 2022-12-08 RX ORDER — AZITHROMYCIN 250 MG/1
TABLET, FILM COATED ORAL
COMMUNITY
Start: 2022-12-05

## 2022-12-08 NOTE — PROGRESS NOTES
"DOS: 2022  NAME: Sonia Henning   : 1970  PCP: Africa Lea MD  Chief Complaint   Patient presents with   • Stroke       Chief complaint: Follow-up for intracerebral hemorrhage  Subjective: 52-year-old female with hypertension seen by me previously for spontaneous lobar intracerebral hemorrhage.  She has essentially no residual deficits.  She did undergo an outpatient CT for headache performed in August of this year which was stable with no new findings.  She denies recent hypertension but has not been following it at home.  She does carry a diagnosis of obstructive sleep apnea but is not currently using CPAP.  She does report persistent poor sleep and some difficulty with memory but she attributes this to menopausal symptoms.    Objective:  Vital signs: /76   Pulse 72   Ht 167.6 cm (66\")   Wt 93 kg (205 lb)   SpO2 98%   BMI 33.09 kg/m²    Exam:  vitals reviewed  MS: oriented x3, recent/remote memory intact, normal attention/concentration, language intact, no neglect.  CN: visual acuity grossly normal, PERRL, EOMI, no facial droop, no dysarthria  Motor: Moving all 4 extremities  Gait: Normal station, no ataxia    Laboratory results:  Lab Results   Component Value Date     (H) 2021            Review and interpretation of imaging: I personally reviewed her most recent head CT performed in August of this year which looks essentially normal.  I also reviewed her prior brain MRI which shows fairly severe white matter disease for age suggestive of chronic hypertension.  Radiology report reviewed.    Diagnoses:  Chronic right cortical intracerebral hemorrhage  Essential hypertension  Obstructive sleep apnea    Assessment/comments: Patient continues to do well with no residual deficits.  I discussed with her the importance of monitoring her blood pressure given her MRI findings and the suspected etiology of her hemorrhage.  I also recommended she start low-dose aspirin for primary " stroke prevention given her family history of heart disease and MRI findings.  We discussed the importance of DIOGO is a risk factor for stroke and intracerebral hemorrhage and I will refer her to sleep medicine for consideration of a repeat sleep study.    Plan:  1.  Start low-dose aspirin  2.  Monitor blood pressure at least several days weekly  3.  Referral to sleep medicine for obstructive sleep apnea    I spent a total of 25 minutes on this clinical encounter including chart/records review, review of laboratory data, personal review of imaging studies, patient counseling, and care coordination.    Follow-up with me as needed

## 2022-12-19 ENCOUNTER — TELEPHONE (OUTPATIENT)
Dept: NEUROLOGY | Facility: CLINIC | Age: 52
End: 2022-12-19

## 2022-12-19 NOTE — TELEPHONE ENCOUNTER
"  Caller: LIBERTY  Relationship to Patient: SELF  Phone Number: [phone]    Reason for Call: PT STATES SHE BOUGHT THE \"RADHA LOW DOSE ASPIRIN 81MG\" AS SUGGESTED. SHE STATES THAT THE BOX ADVISES DO NOT TAKE IF YOU HAVE HIGH BLOOD PRESSURE OR ASTHMA AND ASK PROVIDER BEFORE USING. PLEASE REVIEW AND ASDVISE.   "

## 2023-09-27 ENCOUNTER — TELEPHONE (OUTPATIENT)
Dept: NEUROLOGY | Facility: CLINIC | Age: 53
End: 2023-09-27
Payer: COMMERCIAL

## 2023-09-27 NOTE — TELEPHONE ENCOUNTER
Caller: Sonia Henning    Relationship to patient: Self    Best call back number: [phone]    Chief complaint: PATIENT IS REQUESTING A CALL BACK TO SCHEDULE AN APPOINTMENT WITH DR AG.    Type of visit: F/U    Requested date: NA     If rescheduling, when is the original appointment: NA     PLEASE REVIEW    THANK YOU

## 2023-10-09 ENCOUNTER — OFFICE VISIT (OUTPATIENT)
Dept: NEUROLOGY | Facility: CLINIC | Age: 53
End: 2023-10-09
Payer: COMMERCIAL

## 2023-10-09 VITALS
HEIGHT: 66 IN | HEART RATE: 83 BPM | SYSTOLIC BLOOD PRESSURE: 120 MMHG | DIASTOLIC BLOOD PRESSURE: 70 MMHG | WEIGHT: 187.8 LBS | BODY MASS INDEX: 30.18 KG/M2 | OXYGEN SATURATION: 96 %

## 2023-10-09 DIAGNOSIS — I10 ESSENTIAL HYPERTENSION: ICD-10-CM

## 2023-10-09 DIAGNOSIS — R51.9 ACUTE NONINTRACTABLE HEADACHE, UNSPECIFIED HEADACHE TYPE: ICD-10-CM

## 2023-10-09 DIAGNOSIS — I61.9 CHRONIC INTRACEREBRAL HEMORRHAGE: Primary | ICD-10-CM

## 2023-10-09 PROCEDURE — 99213 OFFICE O/P EST LOW 20 MIN: CPT | Performed by: PSYCHIATRY & NEUROLOGY

## 2023-10-09 RX ORDER — NIFEDIPINE 60 MG/1
60 TABLET, FILM COATED, EXTENDED RELEASE ORAL DAILY
COMMUNITY

## 2023-10-09 RX ORDER — ASPIRIN 81 MG/1
81 TABLET ORAL DAILY
COMMUNITY

## 2023-10-09 RX ORDER — DEXAMETHASONE 4 MG/1
2 TABLET ORAL 2 TIMES DAILY
COMMUNITY

## 2023-10-09 NOTE — PROGRESS NOTES
"DOS: 10/9/2023  NAME: Sonia Henning   : 1970  PCP: Africa Lea MD  Chief Complaint   Patient presents with    Stroke     F/U       Chief complaint: Headache  Subjective: 53-year-old female previously seen for right occipital intracerebral hemorrhage.  This was felt to be most likely due to underlying hypertension which was previously uncontrolled.  MRI head showed moderate to severe underlying white matter disease more than expected for age.  She has a history of obstructive sleep apnea which is untreated.  Her blood pressure has been treated with medication and has been overall under good control.    A week ago there were some changes in her blood pressure medication and she developed fairly abrupt onset of hypertension.  Associated with this she describes some sharp pain in the right neck and occipital region.  Some of this was somewhat electrical in quality and provoked by touching the back of the head.  She endorses some associated right face and cheek pain that she attributes to sinus disease.  Changes were made to her blood pressure medication and she was started on nifedipine.  Following that her blood pressure has been normal and her symptoms resolved.    No other complaints today.    Objective:  Vital signs: /70   Pulse 83   Ht 167.6 cm (66\")   Wt 85.2 kg (187 lb 12.8 oz)   SpO2 96%   BMI 30.31 kg/mý    Exam:  vitals reviewed  MS: oriented x3, recent/remote memory intact, normal attention/concentration, language intact, no neglect.  CN: visual acuity grossly normal, fundi normal, visual fields full PERRL, EOMI, no facial droop, no dysarthria  Motor: 5/5 throughout upper and lower extremities, normal tone  Sensory: intact to cold temperature and vibration throughout  Gait: Normal station, no ataxia    Laboratory results:  Lab Results   Component Value Date     (H) 2021     Diagnoses:  Chronic right occipital intracerebral hemorrhage  Essential hypertension, well " controlled  Right occipital headache and facial pain, resolved  Obstructive sleep apnea, untreated    Assessment/comments: Patient attributes her recent headache to uncontrolled hypertension which is probably reasonable.  Some aspects of her headaches sound overall more consistent with occipital neuralgia.  I educated her about this entity and offered occipital nerve block in the future if symptoms recur.  Given resolution of symptoms with treatment of hypertension I do not think she needs any further neurologic work-up.  We discussed again about her prior diagnosis of sleep apnea.  I encouraged her to consider repeat sleep study if her blood pressure becomes more difficult to control in the future.    Plan:  Consider repeat sleep study in the future if hypertension becomes less well controlled    Can continue to see me as needed especially if headaches recur or if she may need an occipital nerve block in the future.    I spent a total of 20 minutes on this clinical encounter including chart/records review, review of laboratory data, personal review of imaging studies, patient counseling, and care coordination.

## 2023-10-09 NOTE — LETTER
"2023       No Recipients    Patient: Sonia Henning   YOB: 1970   Date of Visit: 10/9/2023     Dear Africa Lea MD:       Thank you for referring Sonia Henning to me for evaluation. Below are the relevant portions of my assessment and plan of care.    If you have questions, please do not hesitate to call me. I look forward to following Sonia along with you.         Sincerely,        Rafi Phillips MD        CC:   No Recipients    Rafi Phillips MD  10/09/23 1059  Sign when Signing Visit  DOS: 10/9/2023  NAME: Sonia Henning   : 1970  PCP: Africa Lea MD  Chief Complaint   Patient presents with    Stroke     F/U       Chief complaint: Headache  Subjective: 53-year-old female previously seen for right occipital intracerebral hemorrhage.  This was felt to be most likely due to underlying hypertension which was previously uncontrolled.  MRI head showed moderate to severe underlying white matter disease more than expected for age.  She has a history of obstructive sleep apnea which is untreated.  Her blood pressure has been treated with medication and has been overall under good control.    A week ago there were some changes in her blood pressure medication and she developed fairly abrupt onset of hypertension.  Associated with this she describes some sharp pain in the right neck and occipital region.  Some of this was somewhat electrical in quality and provoked by touching the back of the head.  She endorses some associated right face and cheek pain that she attributes to sinus disease.  Changes were made to her blood pressure medication and she was started on nifedipine.  Following that her blood pressure has been normal and her symptoms resolved.    No other complaints today.    Objective:  Vital signs: /70   Pulse 83   Ht 167.6 cm (66\")   Wt 85.2 kg (187 lb 12.8 oz)   SpO2 96%   BMI 30.31 kg/mý    Exam:  vitals reviewed  MS: oriented x3, " recent/remote memory intact, normal attention/concentration, language intact, no neglect.  CN: visual acuity grossly normal, fundi normal, visual fields full PERRL, EOMI, no facial droop, no dysarthria  Motor: 5/5 throughout upper and lower extremities, normal tone  Sensory: intact to cold temperature and vibration throughout  Gait: Normal station, no ataxia    Laboratory results:  Lab Results   Component Value Date     (H) 04/14/2021     Diagnoses:  Chronic right occipital intracerebral hemorrhage  Essential hypertension, well controlled  Right occipital headache and facial pain, resolved  Obstructive sleep apnea, untreated    Assessment/comments: Patient attributes her recent headache to uncontrolled hypertension which is probably reasonable.  Some aspects of her headaches sound overall more consistent with occipital neuralgia.  I educated her about this entity and offered occipital nerve block in the future if symptoms recur.  Given resolution of symptoms with treatment of hypertension I do not think she needs any further neurologic work-up.  We discussed again about her prior diagnosis of sleep apnea.  I encouraged her to consider repeat sleep study if her blood pressure becomes more difficult to control in the future.    Plan:  Consider repeat sleep study in the future if hypertension becomes less well controlled    Can continue to see me as needed especially if headaches recur or if she may need an occipital nerve block in the future.    I spent a total of 20 minutes on this clinical encounter including chart/records review, review of laboratory data, personal review of imaging studies, patient counseling, and care coordination.